# Patient Record
Sex: FEMALE | Employment: UNEMPLOYED | URBAN - METROPOLITAN AREA
[De-identification: names, ages, dates, MRNs, and addresses within clinical notes are randomized per-mention and may not be internally consistent; named-entity substitution may affect disease eponyms.]

---

## 2017-03-13 ENCOUNTER — OFFICE VISIT (OUTPATIENT)
Dept: PAIN MANAGEMENT | Age: 72
End: 2017-03-13

## 2017-03-13 VITALS — HEART RATE: 62 BPM | DIASTOLIC BLOOD PRESSURE: 85 MMHG | SYSTOLIC BLOOD PRESSURE: 175 MMHG | HEIGHT: 65 IN

## 2017-03-13 DIAGNOSIS — M15.9 PRIMARY OSTEOARTHRITIS INVOLVING MULTIPLE JOINTS: ICD-10-CM

## 2017-03-13 DIAGNOSIS — Z86.59 HISTORY OF DEPRESSION: ICD-10-CM

## 2017-03-13 DIAGNOSIS — M47.816 FACET ARTHROPATHY, LUMBAR: ICD-10-CM

## 2017-03-13 DIAGNOSIS — Z79.899 ENCOUNTER FOR LONG-TERM (CURRENT) USE OF HIGH-RISK MEDICATION: ICD-10-CM

## 2017-03-13 DIAGNOSIS — Z79.899 ENCOUNTER FOR LONG-TERM (CURRENT) DRUG USE: ICD-10-CM

## 2017-03-13 DIAGNOSIS — G89.29 CHRONIC ABDOMINAL PAIN: ICD-10-CM

## 2017-03-13 DIAGNOSIS — M51.37 DEGENERATION OF LUMBAR OR LUMBOSACRAL INTERVERTEBRAL DISC: Primary | ICD-10-CM

## 2017-03-13 DIAGNOSIS — M25.552 HIP PAIN, LEFT: ICD-10-CM

## 2017-03-13 DIAGNOSIS — R10.9 CHRONIC ABDOMINAL PAIN: ICD-10-CM

## 2017-03-13 DIAGNOSIS — E03.9 ACQUIRED HYPOTHYROIDISM: ICD-10-CM

## 2017-03-13 LAB
ALCOHOL UR POC: NORMAL
AMPHETAMINES UR POC: NORMAL
BARBITURATES UR POC: NORMAL
BENZODIAZEPINES UR POC: NORMAL
BUPRENORPHINE UR POC: NORMAL
CANNABINOIDS UR POC: NORMAL
CARISOPRODOL UR POC: NORMAL
COCAINE UR POC: NORMAL
FENTANYL UR POC: NORMAL
MDMA/ECSTASY UR POC: NORMAL
METHADONE UR POC: NORMAL
METHAMPHETAMINE UR POC: NORMAL
METHYLPHENIDATE UR POC: NORMAL
OPIATES UR POC: NORMAL
OXYCODONE UR POC: NORMAL
PHENCYCLIDINE UR POC: NORMAL
PROPOXYPHENE UR POC: NORMAL
TRAMADOL UR POC: NORMAL
TRICYCLICS UR POC: NORMAL

## 2017-03-13 RX ORDER — LEVOTHYROXINE SODIUM 100 UG/1
100 TABLET ORAL
COMMUNITY

## 2017-03-13 RX ORDER — MORPHINE SULFATE 15 MG/1
30 TABLET ORAL 4 TIMES DAILY
Qty: 240 TAB | Refills: 0 | Status: SHIPPED | OUTPATIENT
Start: 2017-04-14 | End: 2017-03-13 | Stop reason: SDUPTHER

## 2017-03-13 RX ORDER — MORPHINE SULFATE 15 MG/1
30 TABLET ORAL 4 TIMES DAILY
Qty: 240 TAB | Refills: 0 | Status: SHIPPED | OUTPATIENT
Start: 2017-03-16 | End: 2017-03-13 | Stop reason: SDUPTHER

## 2017-03-13 RX ORDER — MORPHINE SULFATE 15 MG/1
30 TABLET ORAL 4 TIMES DAILY
Qty: 240 TAB | Refills: 0 | Status: SHIPPED | OUTPATIENT
Start: 2017-05-13 | End: 2017-08-28 | Stop reason: SDUPTHER

## 2017-03-13 NOTE — PROGRESS NOTES
Nursing Notes    Patient presents to the office today in follow-up. Patient rates her pain at 6/10 on the numerical pain scale. Reviewed medications with counts as follows:    Rx Date filled Qty Dispensed Pill Count Last Dose Short   Morphine sulfate 15 mg 02/15/17 240 15 today no                                    POC UDS was performed in office today    Any new labs or imaging since last appointment? NO    Have you been to an emergency room (ER) or urgent care clinic since your last visit? NO            Have you been hospitalized since your last visit? NO     If yes, where, when, and reason for visit? Have you seen or consulted any other health care providers outside of [facility]  since your last visit? YES     If yes, where, when, and reason for visit? pcp    HM deferred to pcp.

## 2017-03-13 NOTE — MR AVS SNAPSHOT
Visit Information Date & Time Provider Department Dept. Phone Encounter #  
 3/13/2017  1:00 PM Henri [facility] [phone] [id number] Follow-up Instructions Return in about 3 months (around 6/13/2017). Follow-up and Disposition History Upcoming Health Maintenance Date Due DTaP/Tdap/Td series (1 - Tdap) 12/21/1967 BREAST CANCER SCRN MAMMOGRAM 12/21/1996 FOBT Q 1 YEAR AGE 50-75 12/21/1996 ZOSTER VACCINE AGE 60> 12/21/2006 GLAUCOMA SCREENING Q2Y 12/21/2011 OSTEOPOROSIS SCREENING (DEXA) 12/21/2011 Pneumococcal 65+ Low/Medium Risk (1 of 2 - PCV13) 12/21/2011 MEDICARE YEARLY EXAM 12/21/2011 INFLUENZA AGE 9 TO ADULT 8/1/2016 Allergies as of 3/13/2017  Review Complete On: 3/13/2017 By: Sherren Rosser, LPN No Known Allergies Current Immunizations  Never Reviewed No immunizations on file. Not reviewed this visit You Were Diagnosed With   
  
 Codes Comments Degeneration of lumbar or lumbosacral intervertebral disc    -  Primary ICD-10-CM: M51.37 
ICD-9-CM: 722.52 Hip pain, left     ICD-10-CM: M25.552 ICD-9-CM: 719.45 Encounter for long-term (current) drug use     ICD-10-CM: Z79.899 ICD-9-CM: V58.69 History of depression     ICD-10-CM: Z86.59 
ICD-9-CM: V11.8 Primary osteoarthritis involving multiple joints     ICD-10-CM: M15.0 ICD-9-CM: 715.09 Acquired hypothyroidism     ICD-10-CM: E03.9 ICD-9-CM: 176. 9 Chronic abdominal pain     ICD-10-CM: R10.9, G89.29 ICD-9-CM: 789.00, 338.29 Facet arthropathy, lumbar ([address]     ICD-10-CM: M12.88 ICD-9-CM: 721.3 Vitals BP Pulse Height(growth percentile) OB Status Smoking Status 175/85 58 5' 5\" (1.651 m) Hysterectomy Former Smoker Vitals History Preferred Pharmacy Pharmacy Name Phone [address], [address] [place] [address] [phone] Your Updated Medication List  
  
   
This list is accurate as of: 3/13/17  2:09 PM.  Always use your most recent med list. ALDACTONE 25 mg tablet Generic drug:  spironolactone Take  by mouth daily. citalopram 20 mg tablet Commonly known as:  Sheryl Dom Take  by mouth daily. cloNIDine HCl 0.3 mg tablet Commonly known as:  CATAPRES Take 0.3 mg by mouth two (2) times a day. ESTRADIOL TRANSDERMAL PATCH 0.1 mg/24 hr  
Generic drug:  estradiol 1 Patch by TransDERmal route Every Saturday. FISH OIL 1,000 mg Cap Generic drug:  omega-3 fatty acids-vitamin e Take 1 Cap by mouth.  
  
 gabapentin 300 mg capsule Commonly known as:  NEURONTIN Take 1 Cap by mouth three (3) times daily. levothyroxine 88 mcg tablet Commonly known as:  SYNTHROID Take 88 mcg by mouth Daily (before breakfast). LIPITOR 20 mg tablet Generic drug:  atorvastatin Take  by mouth daily. morphine IR 15 mg tablet Commonly known as:  MS IR Take 2 Tabs by mouth four (4) times daily for 30 days. Max Daily Amount: 120 mg. for chronic, severe, refractory pain. Indications: Pain, Severe Pain Start taking on:  5/13/2017 NexIUM 20 mg capsule Generic drug:  esomeprazole Take  by mouth daily. SINEMET  mg per tablet Generic drug:  carbidopa-levodopa Take 1 Tab by mouth three (3) times daily. spironolactone-hydrochlorothiazide 25-25 mg per tablet Commonly known as:  ALDACTAZIDE Take 1 Tab by mouth daily. temazepam 30 mg capsule Commonly known as:  RESTORIL Take  by mouth nightly as needed. VITAMIN D2 50,000 unit capsule Generic drug:  ergocalciferol TAKE 1 CAPSULE BY MOUTH 2 TIMES A WEEK AS DIRECTED  
  
 XANAX PO Take  by mouth. Prescriptions Printed Refills  
 morphine IR (MS IR) 15 mg tablet 0 Starting on: 5/13/2017 Sig: Take 2 Tabs by mouth four (4) times daily for 30 days.  Max Daily Amount: 120 mg. for chronic, severe, refractory pain. Indications: Pain, Severe Pain Class: Print Route: Oral  
  
Follow-up Instructions Return in about 3 months (around 6/13/2017). Patient Instructions Plan: 
Continue same medications as prescribed for chronic pain Follow up in 3 months or sooner if needed Regular exercise and attention to emotional health and diet remain the most effective ways to treat chronic pain of all kinds You may contact me with questions or concerns through [address] Introducing [facility]! Abdiel Salcedo introduces [employer] patient portal. Now you can access parts of your medical record, email your doctor's office, and request medication refills online. 1. In your internet browser, go to [email or web address] 2. Click on the First Time User? Click Here link in the Sign In box. You will see the New Member Sign Up page. 3. Enter your Moozey Access Code exactly as it appears below. You will not need to use this code after youve completed the sign-up process. If you do not sign up before the expiration date, you must request a new code. · Moozey Access Code: [email or web address] Expires: 3/19/2017  2:50 PM 
 
4. Enter the last four digits of your Social Security Number (xxxx) and Date of Birth (mm/dd/yyyy) as indicated and click Submit. You will be taken to the next sign-up page. 5. Create a Moozey ID. This will be your Moozey login ID and cannot be changed, so think of one that is secure and easy to remember. 6. Create a Moozey password. You can change your password at any time. 7. Enter your Password Reset Question and Answer. This can be used at a later time if you forget your password. 8. Enter your e-mail address. You will receive e-mail notification when new information is available in [address] 9. Click Sign Up. You can now view and download portions of your medical record. 10. Click the Download Summary menu link to download a portable copy of your medical information. If you have questions, please visit the Frequently Asked Questions section of the Clicks2Customers website. Remember, Clicks2Customers is NOT to be used for urgent needs. For medical emergencies, dial 911. Now available from your iPhone and Android! Please provide this summary of care documentation to your next provider. Your primary care clinician is listed as Jamey Laguna. If you have any questions after today's visit, please call [phone].

## 2017-03-13 NOTE — PROGRESS NOTES
HISTORY OF PRESENT ILLNESS  Stacia Vega is a 80 y.o. female. Patient presents for follow up of chronic, severe pain in the lumbar spine with radiation to the left hip and down the anterior left thigh into the calf and foot due to lumbar DDD and spondylosis versus nerve damage following a complicated hysterectomy in 1988. She complains of worsening fatigue, malaise, and worsenign pain. She admits, \"this is the first time I've been out of the house in over a month\". She spends much of the day in bed. She recently had her thyroid levels evaluated, and she states that Dr. Ghada Gonzales increased levothyroxine from 75 mcg to 88 mcg. She has follow up scheduled with him in a week to re-evaluate. We discussed the possibility of depression following the death of her son last year, but she does not feel that this is the case. Her situation at home is slowly improving, and her grandson has moved in with her, which has helped her financially. Pain has been under good control since her last visit. She continues to complain of persistent pain in the buttocks and lower back. She continues to describe her pain as aching, shooting, burning and tender. Pain predominates down the front of the left leg and in bilateral hips and knees. This vexing pain began abruptly after a complicated hysterectomy 25 years ago. Pain worsens with prolonged sitting and standing. Pt reports average of 5/10 pain scale most days. She denies any new symptoms. Medications continue to work well for pain control overall. Stacia Vega is tolerating medications well, with no untoward side effects noted. She is able to stay more active with less discomfort with these current doses. The patient reports an average of 60% relief with this regimen. Most recent UDS and  were consistent with prescribed medications. Pill counts are appropriate.  She is informed of side effects, risks, and benefits of this regimen, and emphasizes that she derives a significant improvement in functionality and quality of life, and notes that non-opioid medications and therapies in the past have not offered significant benefit. She denies new or worsening insomnia or constipation issues. She denies any falls, injuries, or hospitalizations since the last visit. HPI--see above    ROS  Constitutional: Negative for fever and chills. HENT: Negative for ear discharge and ear pain. Eyes: Negative for pain and discharge. Respiratory: Negative for hemoptysis and wheezing. Gastrointestinal: Negative for blood in stool and melena. Genitourinary: Negative for dysuria and hematuria. Musculoskeletal: Negative for back pain and joint pain. Skin: Negative for itching and rash. Neurological: Negative for seizures and loss of consciousness. Endo/Heme/Allergies: Negative for environmental allergies. Does not bruise/bleed easily. Psychiatric/Behavioral: Negative for suicidal ideas and hallucinations. Physical Exam  Constitutional: She is oriented to person, place, and time. She appears well-developed and well-nourished. She appears distressed and is tearful. HENT:   Head: Normocephalic and atraumatic. Eyes: Right eye exhibits no discharge. Left eye exhibits no discharge. No scleral icterus. Neck: Normal range of motion. Neck supple. No thyromegaly present. Pulmonary/Chest: Effort normal. No respiratory distress. Musculoskeletal: She exhibits tenderness. She exhibits no edema. Right shoulder: She exhibits tenderness, pain and spasm. She exhibits normal range of motion. Left hip: She exhibits tenderness. She exhibits normal range of motion. Right knee: She exhibits decreased range of motion. She exhibits no swelling. tenderness found. Left knee: She exhibits decreased range of motion. She exhibits no swelling. tenderness found. Lumbar back: She exhibits decreased range of motion and tenderness. She exhibits no pain and no spasm. Back:         Arms:       Legs:  Neurological: She is alert and oriented to person, place, and time. No cranial nerve deficit. Skin: Skin is warm and dry. She is not diaphoretic. No erythema. Psychiatric: She has a normal mood and affect. Her behavior is normal. Judgment and thought content normal.   ASSESSMENT and PLAN    ICD-10-CM ICD-9-CM    1. Degeneration of lumbar or lumbosacral intervertebral disc M51.37 722.52    2. Hip pain, left M25.552 719.45    3. Encounter for long-term (current) drug use Z79.899 V58.69    4. History of depression Z86.59 V11.8    5. Primary osteoarthritis involving multiple joints M15.0 715.09    6. Acquired hypothyroidism E03.9 244.9    7. Chronic abdominal pain R10.9 789.00     G89.29 338.29    8.  Facet arthropathy, lumbar (HCC) M12.88 721.3       Plan:  Continue same medications as prescribed for chronic pain  Follow up in 3 months or sooner if needed  Regular exercise and attention to emotional health and diet remain the most effective ways to treat chronic pain of all kinds  You may contact me with questions or concerns through [employer]

## 2017-06-05 ENCOUNTER — OFFICE VISIT (OUTPATIENT)
Dept: PAIN MANAGEMENT | Age: 72
End: 2017-06-05

## 2017-06-05 VITALS
SYSTOLIC BLOOD PRESSURE: 170 MMHG | WEIGHT: 182 LBS | DIASTOLIC BLOOD PRESSURE: 91 MMHG | BODY MASS INDEX: 30.29 KG/M2 | HEART RATE: 71 BPM

## 2017-06-05 DIAGNOSIS — M47.816 FACET ARTHROPATHY, LUMBAR: ICD-10-CM

## 2017-06-05 DIAGNOSIS — M25.552 HIP PAIN, LEFT: ICD-10-CM

## 2017-06-05 DIAGNOSIS — M51.37 DEGENERATION OF LUMBAR OR LUMBOSACRAL INTERVERTEBRAL DISC: ICD-10-CM

## 2017-06-05 DIAGNOSIS — G89.4 CHRONIC PAIN SYNDROME: ICD-10-CM

## 2017-06-05 DIAGNOSIS — M15.9 PRIMARY OSTEOARTHRITIS INVOLVING MULTIPLE JOINTS: Primary | ICD-10-CM

## 2017-06-05 DIAGNOSIS — Z79.899 ENCOUNTER FOR LONG-TERM (CURRENT) DRUG USE: ICD-10-CM

## 2017-06-05 RX ORDER — NALOXONE HYDROCHLORIDE 4 MG/.1ML
4 SPRAY NASAL AS NEEDED
Qty: 1 BOX | Refills: 1 | Status: SHIPPED | OUTPATIENT
Start: 2017-06-05

## 2017-06-05 RX ORDER — MORPHINE SULFATE 15 MG/1
30 TABLET ORAL 4 TIMES DAILY
Qty: 240 TAB | Refills: 0 | Status: SHIPPED | OUTPATIENT
Start: 2017-08-07 | End: 2017-11-20 | Stop reason: SDUPTHER

## 2017-06-05 RX ORDER — MORPHINE SULFATE 15 MG/1
30 TABLET ORAL 4 TIMES DAILY
Qty: 240 TAB | Refills: 0 | Status: SHIPPED | OUTPATIENT
Start: 2017-07-09 | End: 2017-08-28 | Stop reason: SDUPTHER

## 2017-06-05 RX ORDER — MORPHINE SULFATE 15 MG/1
30 TABLET ORAL 4 TIMES DAILY
Qty: 240 TAB | Refills: 0 | Status: SHIPPED | OUTPATIENT
Start: 2017-06-11 | End: 2017-08-28 | Stop reason: SDUPTHER

## 2017-06-05 NOTE — MR AVS SNAPSHOT
Visit Information Date & Time Provider Department Dept. Phone Encounter #  
 6/5/2017  1:40 PM Sabina Bauer MD [address] [facility] [phone] Follow-up Instructions Return in about 3 months (around 9/5/2017). Upcoming Health Maintenance Date Due DTaP/Tdap/Td series (1 - Tdap) 12/21/1967 BREAST CANCER SCRN MAMMOGRAM 12/21/1996 FOBT Q 1 YEAR AGE 50-75 12/21/1996 ZOSTER VACCINE AGE 60> 12/21/2006 GLAUCOMA SCREENING Q2Y 12/21/2011 OSTEOPOROSIS SCREENING (DEXA) 12/21/2011 Pneumococcal 65+ Low/Medium Risk (1 of 2 - PCV13) 12/21/2011 MEDICARE YEARLY EXAM 12/21/2011 INFLUENZA AGE 9 TO ADULT 8/1/2017 Allergies as of 6/5/2017  Review Complete On: 6/5/2017 By: Sabina Bauer MD  
 No Known Allergies Current Immunizations  Never Reviewed No immunizations on file. Not reviewed this visit Vitals BP Pulse Weight(growth percentile) BMI OB Status Smoking Status (!) 170/91 71 182 lb (82.6 kg) 30.29 kg/m2 Hysterectomy Former Smoker Vitals History BMI and BSA Data Body Mass Index Body Surface Area  
 30.29 kg/m 2 1.95 m 2 Preferred Pharmacy Pharmacy Name Phone [address], [address] [place] [address] [phone] Your Updated Medication List  
  
   
This list is accurate as of: 6/5/17  1:58 PM.  Always use your most recent med list. ALDACTONE 25 mg tablet Generic drug:  spironolactone Take  by mouth daily. citalopram 20 mg tablet Commonly known as:  Mardell Fortis Take  by mouth daily. cloNIDine HCl 0.3 mg tablet Commonly known as:  CATAPRES Take 0.3 mg by mouth two (2) times a day. ESTRADIOL TRANSDERMAL PATCH 0.1 mg/24 hr  
Generic drug:  estradiol 1 Patch by TransDERmal route Every Saturday. FISH OIL 1,000 mg Cap Generic drug:  omega-3 fatty acids-vitamin e Take 1 Cap by mouth. gabapentin 300 mg capsule Commonly known as:  NEURONTIN Take 1 Cap by mouth three (3) times daily. levothyroxine 88 mcg tablet Commonly known as:  SYNTHROID Take 88 mcg by mouth Daily (before breakfast). LIPITOR 20 mg tablet Generic drug:  atorvastatin Take  by mouth daily. * morphine IR 15 mg tablet Commonly known as:  MS IR Take 2 Tabs by mouth four (4) times daily for 30 days. Max Daily Amount: 120 mg. for chronic, severe, refractory pain. Indications: Pain, Severe Pain * morphine IR 15 mg tablet Commonly known as:  MS IR Take 2 Tabs by mouth four (4) times daily for 30 days. Max Daily Amount: 120 mg. for chronic, severe, refractory pain. Indications: Pain, Severe Pain Start taking on:  6/11/2017 * morphine IR 15 mg tablet Commonly known as:  MS IR Take 2 Tabs by mouth four (4) times daily for 30 days. Max Daily Amount: 120 mg. for chronic, severe, refractory pain. Indications: Pain, Severe Pain Start taking on:  7/9/2017 * morphine IR 15 mg tablet Commonly known as:  MS IR Take 2 Tabs by mouth four (4) times daily for 30 days. Max Daily Amount: 120 mg. for chronic, severe, refractory pain. Indications: Pain, Severe Pain Start taking on:  8/7/2017  
  
 naloxone 4 mg/actuation Spry 4 mg by Nasal route as needed for up to 2 doses. Indications: OPIOID TOXICITY NexIUM 20 mg capsule Generic drug:  esomeprazole Take  by mouth daily. SINEMET  mg per tablet Generic drug:  carbidopa-levodopa Take 1 Tab by mouth three (3) times daily. spironolactone-hydrochlorothiazide 25-25 mg per tablet Commonly known as:  ALDACTAZIDE Take 1 Tab by mouth daily. temazepam 30 mg capsule Commonly known as:  RESTORIL Take  by mouth nightly as needed. VITAMIN D2 50,000 unit capsule Generic drug:  ergocalciferol TAKE 1 CAPSULE BY MOUTH 2 TIMES A WEEK AS DIRECTED  
  
 XANAX PO Take  by mouth. * Notice: This list has 4 medication(s) that are the same as other medications prescribed for you. Read the directions carefully, and ask your doctor or other care provider to review them with you. Prescriptions Printed Refills  
 morphine IR (MS IR) 15 mg tablet 0 Starting on: 2017 Sig: Take 2 Tabs by mouth four (4) times daily for 30 days. Max Daily Amount: 120 mg. for chronic, severe, refractory pain. Indications: Pain, Severe Pain Class: Print Route: Oral  
  
Prescriptions Sent to Pharmacy Refills  
 naloxone 4 mg/actuation spry 1 Si mg by Nasal route as needed for up to 2 doses. Indications: OPIOID TOXICITY Class: Normal  
 Pharmacy: [id number] [place] [address], [facility] #: [phone] Route: Nasal  
  
Follow-up Instructions Return in about 3 months (around 2017). Patient Instructions Current health maintenance issues were reviewed and the patient was advised to followup with his/her PCP for completion of these items. Introducing [facility]! Gemma Bruce introduces [employer] patient portal. Now you can access parts of your medical record, email your doctor's office, and request medication refills online. 1. In your internet browser, go to [email or web address] 2. Click on the First Time User? Click Here link in the Sign In box. You will see the New Member Sign Up page. 3. Enter your datatracker Access Code exactly as it appears below. You will not need to use this code after youve completed the sign-up process. If you do not sign up before the expiration date, you must request a new code. · datatracker Access Code: [email or web address] Expires: 9/3/2017  1:58 PM 
 
4. Enter the last four digits of your Social Security Number (xxxx) and Date of Birth (mm/dd/yyyy) as indicated and click Submit. You will be taken to the next sign-up page. 5. Create a datatracker ID. This will be your datatracker login ID and cannot be changed, so think of one that is secure and easy to remember. 6. Create a datatracker password. You can change your password at any time. 7. Enter your Password Reset Question and Answer. This can be used at a later time if you forget your password. 8. Enter your e-mail address. You will receive e-mail notification when new information is available in [address] 9. Click Sign Up. You can now view and download portions of your medical record. 10. Click the Download Summary menu link to download a portable copy of your medical information. If you have questions, please visit the Frequently Asked Questions section of the datatracker website. Remember, datatracker is NOT to be used for urgent needs. For medical emergencies, dial 911. Now available from your iPhone and Android! Please provide this summary of care documentation to your next provider. Your primary care clinician is listed as Renzo Avers. If you have any questions after today's visit, please call [phone].

## 2017-06-05 NOTE — PROGRESS NOTES
HISTORY OF PRESENT ILLNESS  Darryl Hernandez is a 80 y.o. female. HPI Patient presents for follow up of chronic, severe pain in the lumbar spine with radiation to the left hip and down the anterior left thigh into the calf and foot due to lumbar DDD and spondylosis versus nerve damage following a complicated hysterectomy in 1988. Since last seen, she has been diagnosed with a strep infection of the left lower extremity for which she is currently on doxycycline. She does feel there has been improvement. Pain has been under good control since her last visit. She continues to complain of persistent pain in the buttocks and lower back. She continues to describe her pain as aching, shooting, burning and tender. Pain predominates down the front of the left leg and in bilateral hips and knees. This vexing pain began abruptly after a complicated hysterectomy 25 years ago. Pain worsens with prolonged sitting and standing. Pt reports average of 5/10 pain scale most days. She denies any new symptoms. Pain level today 5 out of 10, outcome 14/28,(The lower the upper number, the better the outcome)  Physical activity and mobility, mood, and sleep are all poor. No reported side effects. Medications continue to work well for pain control overall. Darryl Hernandez is tolerating medications well, with no untoward side effects noted. She is able to stay more active with less discomfort with these current doses. The patient reports an average of 60% relief with this regimen. Most recent UDS and  were consistent with prescribed medications. Pill counts are appropriate. She is informed of side effects, risks, and benefits of this regimen, and emphasizes that she derives a significant improvement in functionality and quality of life, and notes that non-opioid medications and therapies in the past have not offered significant benefit. She denies new or worsening insomnia or constipation issues.  She denies any falls, injuries, or hospitalizations since the last visit. Review of Systems   Constitutional: Negative for chills and fever. HENT: Negative for ear discharge and ear pain. Eyes: Negative for pain and discharge. Respiratory: Negative for hemoptysis and wheezing. Gastrointestinal: Negative for blood in stool and melena. Genitourinary: Negative for dysuria and hematuria. Musculoskeletal: Negative for back pain and joint pain. Skin: Negative for itching and rash. Neurological: Negative for seizures and loss of consciousness. Endo/Heme/Allergies: Negative for environmental allergies. Does not bruise/bleed easily. Psychiatric/Behavioral: Negative for hallucinations and suicidal ideas. All other systems reviewed and are negative. Physical Exam   Constitutional: She appears well-developed and well-nourished. She is cooperative. She does not have a sickly appearance. HENT:   Head: Normocephalic and atraumatic. Right Ear: External ear normal. No drainage. Left Ear: External ear normal. No drainage. Nose: Nose normal.   Eyes: Lids are normal. Right eye exhibits no discharge. Left eye exhibits no discharge. Right conjunctiva has no hemorrhage. Left conjunctiva has no hemorrhage. Neck: Neck supple. No tracheal deviation present. No thyroid mass and no thyromegaly present. Pulmonary/Chest: Effort normal. No respiratory distress. Neurological: She is alert. No cranial nerve deficit. Skin: Skin is intact. No rash noted. Psychiatric: Her speech is normal. Her affect is not angry. She does not express inappropriate judgment. Nursing note and vitals reviewed. ASSESSMENT and PLAN  Encounter Diagnoses   Name Primary?     Primary osteoarthritis involving multiple joints Yes    Chronic pain syndrome     Encounter for long-term (current) drug use     Hip pain, left     Facet arthropathy, lumbar (HCC)     Degeneration of lumbar or lumbosacral intervertebral disc      She will continue on her current analgesic regimen as this is providing good pain control with improve functionality and minimal side effects. Because the patient's current regimen places him/her at increased risk for possible overdose, a prescription for naloxone nasal spray is being provided. The patient understands that this medication is only to be used in the setting of a possible overdose and that inadvertent use of this medication could precipitate overt withdrawal.    3 month reassess her    No concerns are raised for misuse, abuse, or diversion. 1. Pain medications are prescribed with the objective of pain relief and improved physical and psychosocial function in this patient. 2. Counseled patient on proper use of prescribed medications and reviewed opioid contract. 3. Counseled patient about chronic medical conditions and their relationship to anxiety and depression and recommended mental health support as needed. 4. Reviewed with patient self-help tools, home exercise, and lifestyle changes to assist the patient in self-management of symptoms. 5. Advised patient to have a primary care provider to continue care for health maintenance and general medical conditions and support for referral to specialty care as needed. 6. Reviewed with patient the treatment plan, goals of treatment plan, and limitations of treatment plan, to include the potential for side effects from medications and procedures. If side effects occur, it is the responsibility of the patient to inform the clinic so that a change in the treatment plan can be made in a safe manner. The patient is advised that stopping prescribed medication may cause an increase in symptoms and possible medication withdrawal symptoms. The patient is informed an emergency room evaluation may be necessary if this occurs. DISPOSITION: The patients condition and plan were discussed at length and all questions were answered. The patient agrees with the plan.     Counseling occupied > 50% of visit:  Total time: 40 minutes

## 2017-07-19 DIAGNOSIS — M25.552 HIP PAIN, LEFT: ICD-10-CM

## 2017-07-19 DIAGNOSIS — M47.816 FACET ARTHROPATHY, LUMBAR: ICD-10-CM

## 2017-07-19 DIAGNOSIS — Z86.59 HISTORY OF DEPRESSION: ICD-10-CM

## 2017-07-19 DIAGNOSIS — G89.4 CHRONIC PAIN SYNDROME: ICD-10-CM

## 2017-07-19 DIAGNOSIS — Z79.899 ENCOUNTER FOR LONG-TERM (CURRENT) DRUG USE: ICD-10-CM

## 2017-07-20 RX ORDER — GABAPENTIN 300 MG/1
300 CAPSULE ORAL 3 TIMES DAILY
Qty: 90 CAP | Refills: 5 | Status: SHIPPED | OUTPATIENT
Start: 2017-07-20 | End: 2021-10-04

## 2017-08-28 ENCOUNTER — OFFICE VISIT (OUTPATIENT)
Dept: PAIN MANAGEMENT | Age: 72
End: 2017-08-28

## 2017-08-28 VITALS
HEART RATE: 58 BPM | TEMPERATURE: 97.8 F | SYSTOLIC BLOOD PRESSURE: 143 MMHG | RESPIRATION RATE: 22 BRPM | BODY MASS INDEX: 30.29 KG/M2 | WEIGHT: 182 LBS | DIASTOLIC BLOOD PRESSURE: 76 MMHG

## 2017-08-28 DIAGNOSIS — Z79.899 ENCOUNTER FOR LONG-TERM (CURRENT) USE OF HIGH-RISK MEDICATION: Primary | ICD-10-CM

## 2017-08-28 DIAGNOSIS — M47.816 FACET ARTHROPATHY, LUMBAR: ICD-10-CM

## 2017-08-28 DIAGNOSIS — G89.4 CHRONIC PAIN SYNDROME: ICD-10-CM

## 2017-08-28 DIAGNOSIS — M51.37 DEGENERATION OF LUMBAR OR LUMBOSACRAL INTERVERTEBRAL DISC: ICD-10-CM

## 2017-08-28 DIAGNOSIS — M79.2 NEURALGIA: ICD-10-CM

## 2017-08-28 DIAGNOSIS — Z79.899 ENCOUNTER FOR LONG-TERM (CURRENT) DRUG USE: ICD-10-CM

## 2017-08-28 DIAGNOSIS — M15.9 PRIMARY OSTEOARTHRITIS INVOLVING MULTIPLE JOINTS: ICD-10-CM

## 2017-08-28 DIAGNOSIS — Z86.59 HISTORY OF DEPRESSION: ICD-10-CM

## 2017-08-28 DIAGNOSIS — M25.552 HIP PAIN, LEFT: ICD-10-CM

## 2017-08-28 LAB
ALCOHOL UR POC: NORMAL
AMPHETAMINES UR POC: NEGATIVE
BARBITURATES UR POC: NEGATIVE
BENZODIAZEPINES UR POC: NORMAL
BUPRENORPHINE UR POC: NORMAL
CANNABINOIDS UR POC: NEGATIVE
CARISOPRODOL UR POC: NORMAL
COCAINE UR POC: NEGATIVE
FENTANYL UR POC: NORMAL
MDMA/ECSTASY UR POC: NEGATIVE
METHADONE UR POC: NEGATIVE
METHAMPHETAMINE UR POC: NEGATIVE
METHYLPHENIDATE UR POC: NEGATIVE
OPIATES UR POC: NORMAL
OXYCODONE UR POC: NEGATIVE
PHENCYCLIDINE UR POC: NORMAL
PROPOXYPHENE UR POC: NORMAL
TRAMADOL UR POC: NORMAL
TRICYCLICS UR POC: NEGATIVE

## 2017-08-28 RX ORDER — MORPHINE SULFATE 15 MG/1
30 TABLET ORAL 4 TIMES DAILY
Qty: 240 TAB | Refills: 0 | Status: SHIPPED | OUTPATIENT
Start: 2017-09-06 | End: 2017-11-20 | Stop reason: SDUPTHER

## 2017-08-28 RX ORDER — MORPHINE SULFATE 15 MG/1
30 TABLET ORAL 4 TIMES DAILY
Qty: 240 TAB | Refills: 0 | Status: SHIPPED | OUTPATIENT
Start: 2017-11-05 | End: 2017-12-05

## 2017-08-28 RX ORDER — MORPHINE SULFATE 15 MG/1
30 TABLET ORAL 4 TIMES DAILY
Qty: 240 TAB | Refills: 0 | Status: SHIPPED | OUTPATIENT
Start: 2017-10-06 | End: 2017-11-20 | Stop reason: SDUPTHER

## 2017-08-28 NOTE — PROGRESS NOTES
Nursing Notes    Patient presents to the office today in follow-up. Patient rates her pain at 7/10 on the numerical pain scale. Reviewed medications with counts as follows:    Rx Date filled Qty Dispensed Pill Count Last Dose Short   Morphine sulfate 15mg IR 08/07/17 240 68 This am  No                                           Comments:     POC UDS was performed in office today    Any new labs or imaging since last appointment? YES; labwork with pcp and urgent care     Have you been to an emergency room (ER) or urgent care clinic since your last visit? YES    ; urgent care for strep         Have you been hospitalized since your last visit? NO     If yes, where, when, and reason for visit? Have you seen or consulted any other health care providers outside of the [address] [place] [address]  since your last visit? YES     If yes, where, when, and reason for visit? pcp , urgent care physicians     HM deferred to pcp.

## 2017-08-28 NOTE — PROGRESS NOTES
Is talked to me HISTORY OF PRESENT ILLNESS  Rubi David is a 80 y.o. female    HPI: Ms. Venu Waters presents today for follow up of chronic, severe pain in the lumbar spine with radiation to the left hip and down the anterior left thigh into the calf and foot due to lumbar DDD and spondylosis versus nerve damage following a complicated hysterectomy in 1988. No history of  prior surgeries to the back. History of injections and physical therapy with poor results. This is my first visit with this patient. The patient denies any significant changes since last f/u. Ms. Venu Waters does report her sleep has improved since last visit. She is still grieving the death of her 63-year-old son from a sudden pulmonary embolism. She states, \"he did always say he hopes he does not suffer when it is his time to go\". She is happy that he did not suffer. She tolerates her medications without side effects. Rubi David reports no change in constipation. No cpap    Current medication management consists of: Morphine IR 15 mg tab, take 2 tablets PO four times daily   Pain has been under good control since her last visit. She continues to complain of persistent pain in the buttocks (L>R) and lower back. She continues to describe her pain as aching, shooting, burning and tender. Pain predominates down the front of the left leg and in bilateral hips and knees. This vexing pain began abruptly after a complicated hysterectomy 25 years ago. Pain worsens with prolonged sitting and standing. Pt reports average of 7/10 pain scale most days. She denies any new symptoms. The patient reports an average of 60% relief with this regimen. Ms. Venu Waters currently lives with her 63-year-old grandson. She does try to get some exercise around the house. She reports she cannot run a vacuum around the house anymore but she does use a carpet sweeper as best she can. Pain Meds and Quality Of Life have been reviewed. Nonpharmacologic therapy and non-opioid pharmacologic therapy were considered. If opioid therapy is prescribed, this is only if the expected benefits are anticipated to outweigh risks. She  is otherwise doing well with no other complaints today. She denies any adverse events including nausea, vomiting, dizziness, increased constipation, hallucinations, or seizures. The patient reports functional improvement and QOL with pain medication. Vitals:    08/28/17 1039   BP: 143/76   Pulse: (!) 58   Resp: 22   Temp: 97.8 °F (36.6 °C)   TempSrc: Axillary   Weight: 82.6 kg (182 lb)   PainSc:   7   PainLoc: Hip         No Known Allergies    Past Surgical History:   Procedure Laterality Date    HX HYSTERECTOMY      HX OTHER SURGICAL      surgical repair for hydronephrosis, right side    HX OTHER SURGICAL  11-    antrectomy with Billroth II anastomosis       Review of Systems   Constitutional: Positive for malaise/fatigue. Negative for chills, fever and weight loss. HENT: Negative for ear pain, hearing loss, nosebleeds, sinus pain and sore throat. Seasonal allergies   Eyes: Negative for blurred vision, double vision and pain. Respiratory: Negative for cough, shortness of breath and wheezing. Cardiovascular: Positive for chest pain and leg swelling. Negative for palpitations. She has a hiatial hernia. Followed by PCP   Gastrointestinal: Positive for constipation and heartburn. Negative for diarrhea, nausea and vomiting. Relief for heartache with nexium  Constipation relief with OTC   Genitourinary: Negative for dysuria, frequency and urgency. Musculoskeletal: Positive for joint pain. Negative for back pain, falls, myalgias and neck pain. Skin: Negative for itching and rash. Neurological: Negative for dizziness, tingling, tremors, seizures, loss of consciousness, weakness and headaches. Psychiatric/Behavioral: Positive for depression. Negative for suicidal ideas.  The patient is not nervous/anxious and does not have insomnia. Physical Exam   Constitutional: She is oriented to person, place, and time and well-developed, well-nourished, and in no distress. She appears healthy. Non-toxic appearance. No distress. HENT:   Head: Normocephalic and atraumatic. Eyes: Right eye exhibits no discharge. Left eye exhibits no discharge. Neck: Neck supple. Pulmonary/Chest: Effort normal.   Musculoskeletal: She exhibits tenderness. Right hip: She exhibits decreased strength and tenderness. Left hip: She exhibits decreased strength and tenderness. Right knee: Tenderness found. Lumbar back: She exhibits tenderness and pain. Neurological: She is alert and oriented to person, place, and time. Skin: Skin is warm and dry. She is not diaphoretic. Psychiatric: Mood and affect normal.   Nursing note and vitals reviewed. ASSESSMENT:    1. Encounter for long-term (current) use of high-risk medication    2. Degeneration of lumbar or lumbosacral intervertebral disc    3. Neuralgia    4. Hip pain, left    5. Primary osteoarthritis involving multiple joints    6. Facet arthropathy, lumbar    7. Chronic pain syndrome    8. History of depression    9. Encounter for long-term (current) drug use          [place] Prescription Monitoring Program was reviewed which does not demonstrate aberrancies and/or inconsistencies with regard to the historical prescribing of controlled medications to this patient by other providers. Medications were brought to visit today. Pill count was appropriate. The patients condition and plan were discussed. All questions were answered. The patient agrees with the plan. PLAN / Pt Instructions:  1. Continue current plan with no evidence of addiction or diversion. 2. Stable on current medication without adverse events. 3. Refilled Morphine IR 15 mg tab, take 2 tablets PO four times daily   4.  Discussed risks of addiction, dependency, and opioid induced hyperalgesia. 5. Reviewed with patient benefits of home exercise, and lifestyle changes to assist the patient in self-management of symptoms. 6. Return to clinic in 3 months      Medications Ordered Today   Medications    morphine IR (MS IR) 15 mg tablet     Sig: Take 2 Tabs by mouth four (4) times daily for 30 days. Max Daily Amount: 120 mg. for chronic, severe, refractory pain. Indications: Pain, Severe Pain     Dispense:  240 Tab     Refill:  0    morphine IR (MS IR) 15 mg tablet     Sig: Take 2 Tabs by mouth four (4) times daily for 30 days. Max Daily Amount: 120 mg. for chronic, severe, refractory pain. Indications: Pain, Severe Pain     Dispense:  240 Tab     Refill:  0    morphine IR (MS IR) 15 mg tablet     Sig: Take 2 Tabs by mouth four (4) times daily for 30 days. Max Daily Amount: 120 mg. for chronic, severe, refractory pain. Indications: Pain, Severe Pain     Dispense:  240 Tab     Refill:  0       Prescription monitoring program reviewed. POC UDS today. Pain medications prescribed with the objective of pain relief and improved physical and psychosocial function in this patient. DISPOSITION  · Counseled patient on proper use of prescribed medications. · Counseled patient about chronic medical conditions and their relationship to anxiety and depression and recommended mental health support as needed. · Reviewed with patient self-help tools, home exercise, and lifestyle changes to assist the patient in self-management of symptoms. · Reviewed with patient the treatment plan, goals of treatment plan, and limitations of treatment plan, to include the potential for side effects from medications and procedures. If side effects occur, it is the responsibility of the patient to inform the clinic so that a change in the treatment plan can be made in a safe manner.  The patient is advised that stopping prescribed medication may cause an increase in symptoms and possible medication withdrawal symptoms. The patient is informed an emergency room evaluation may be necessary if this occurs. Spent 25 minutes with patient today reviewing the treatment plan, goals of treatment plan, and limitations of the treatment plan, to include the potential for side effects from medications and procedures. Chi Jackman PA-C 8/28/2017        Note: Please excuse any typographical errors. Voice recognition software was used for this note and may cause mistakes.

## 2017-08-28 NOTE — PATIENT INSTRUCTIONS
PLAN / Pt Instructions:  1. Continue current plan with no evidence of addiction or diversion. 2. Stable on current medication without adverse events. 3. Refilled Morphine IR 15 mg tab, take 2 tablets PO four times daily   4. Discussed risks of addiction, dependency, and opioid induced hyperalgesia. 5. Reviewed with patient benefits of home exercise, and lifestyle changes to assist the patient in self-management of symptoms.   6. Return to clinic in 3 months

## 2017-08-28 NOTE — MR AVS SNAPSHOT
Visit Information Date & Time Provider Department Dept. Phone Encounter #  
 8/28/2017 10:40 AM Greg Dove [address] [place] [address] for Pain Management [phone] Follow-up Instructions Return in about 3 months (around 11/28/2017). Upcoming Health Maintenance Date Due DTaP/Tdap/Td series (1 - Tdap) 12/21/1967 BREAST CANCER SCRN MAMMOGRAM 12/21/1996 FOBT Q 1 YEAR AGE 50-75 12/21/1996 ZOSTER VACCINE AGE 60> 10/21/2006 GLAUCOMA SCREENING Q2Y 12/21/2011 OSTEOPOROSIS SCREENING (DEXA) 12/21/2011 Pneumococcal 65+ Low/Medium Risk (1 of 2 - PCV13) 12/21/2011 MEDICARE YEARLY EXAM 12/21/2011 INFLUENZA AGE 9 TO ADULT 8/1/2017 Allergies as of 8/28/2017  Review Complete On: 8/28/2017 By: Trip Torres PA-C No Known Allergies Current Immunizations  Never Reviewed No immunizations on file. Not reviewed this visit You Were Diagnosed With   
  
 Codes Comments Encounter for long-term (current) use of high-risk medication    -  Primary ICD-10-CM: Q51.445 ICD-9-CM: V58.69 Degeneration of lumbar or lumbosacral intervertebral disc     ICD-10-CM: M51.37 
ICD-9-CM: 722.52 Neuralgia     ICD-10-CM: M79.2 ICD-9-CM: 729.2 Hip pain, left     ICD-10-CM: M25.552 ICD-9-CM: 719.45 Primary osteoarthritis involving multiple joints     ICD-10-CM: M15.0 ICD-9-CM: 715.09 Facet arthropathy, lumbar     ICD-10-CM: M12.88 ICD-9-CM: 274. 3 Chronic pain syndrome     ICD-10-CM: G89.4 ICD-9-CM: 338.4 History of depression     ICD-10-CM: Z86.59 
ICD-9-CM: V11.8 Encounter for long-term (current) drug use     ICD-10-CM: Z79.899 ICD-9-CM: V58.69 Vitals BP Pulse Temp Resp Weight(growth percentile) BMI  
 143/76 (!) 58 97.8 °F (36.6 °C) (Axillary) 22 182 lb (82.6 kg) 30.29 kg/m2 OB Status Smoking Status Hysterectomy Former Smoker Vitals History BMI and BSA Data Body Mass Index Body Surface Area  
 30.29 kg/m 2 1.95 m 2 Preferred Pharmacy Pharmacy Name Phone [address], [address] [place] [address] [phone] Your Updated Medication List  
  
   
This list is accurate as of: 8/28/17 11:26 AM.  Always use your most recent med list. ALDACTONE 25 mg tablet Generic drug:  spironolactone Take  by mouth daily. citalopram 20 mg tablet Commonly known as:  [place] Take  by mouth daily. cloNIDine HCl 0.3 mg tablet Commonly known as:  CATAPRES Take 0.3 mg by mouth two (2) times a day. ESTRADIOL TRANSDERMAL PATCH 0.1 mg/24 hr  
Generic drug:  estradiol 1 Patch by TransDERmal route Every Saturday. FISH OIL 1,000 mg Cap Generic drug:  omega-3 fatty acids-vitamin e Take 1 Cap by mouth.  
  
 gabapentin 300 mg capsule Commonly known as:  NEURONTIN Take 1 Cap by mouth three (3) times daily. levothyroxine 88 mcg tablet Commonly known as:  SYNTHROID Take 88 mcg by mouth Daily (before breakfast). LIPITOR 20 mg tablet Generic drug:  atorvastatin Take  by mouth daily. * morphine IR 15 mg tablet Commonly known as:  MS IR Take 2 Tabs by mouth four (4) times daily for 30 days. Max Daily Amount: 120 mg. for chronic, severe, refractory pain. Indications: Pain, Severe Pain * morphine IR 15 mg tablet Commonly known as:  MS IR Take 2 Tabs by mouth four (4) times daily for 30 days. Max Daily Amount: 120 mg. for chronic, severe, refractory pain. Indications: Pain, Severe Pain Start taking on:  9/6/2017 * morphine IR 15 mg tablet Commonly known as:  MS IR Take 2 Tabs by mouth four (4) times daily for 30 days. Max Daily Amount: 120 mg. for chronic, severe, refractory pain. Indications: Pain, Severe Pain Start taking on:  10/6/2017 * morphine IR 15 mg tablet Commonly known as:  MS IR  
 Take 2 Tabs by mouth four (4) times daily for 30 days. Max Daily Amount: 120 mg. for chronic, severe, refractory pain. Indications: Pain, Severe Pain Start taking on:  11/5/2017  
  
 naloxone 4 mg/actuation Spry 4 mg by Nasal route as needed for up to 2 doses. Indications: OPIOID TOXICITY NexIUM 20 mg capsule Generic drug:  esomeprazole Take  by mouth daily. SINEMET  mg per tablet Generic drug:  carbidopa-levodopa Take 1 Tab by mouth three (3) times daily. spironolactone-hydrochlorothiazide 25-25 mg per tablet Commonly known as:  ALDACTAZIDE Take 1 Tab by mouth daily. temazepam 30 mg capsule Commonly known as:  RESTORIL Take  by mouth nightly as needed. VITAMIN D2 50,000 unit capsule Generic drug:  ergocalciferol TAKE 1 CAPSULE BY MOUTH 2 TIMES A WEEK AS DIRECTED  
  
 XANAX PO Take  by mouth. * Notice: This list has 4 medication(s) that are the same as other medications prescribed for you. Read the directions carefully, and ask your doctor or other care provider to review them with you. Prescriptions Printed Refills  
 morphine IR (MS IR) 15 mg tablet 0 Starting on: 11/5/2017 Sig: Take 2 Tabs by mouth four (4) times daily for 30 days. Max Daily Amount: 120 mg. for chronic, severe, refractory pain. Indications: Pain, Severe Pain Class: Print Route: Oral  
 morphine IR (MS IR) 15 mg tablet 0 Starting on: 10/6/2017 Sig: Take 2 Tabs by mouth four (4) times daily for 30 days. Max Daily Amount: 120 mg. for chronic, severe, refractory pain. Indications: Pain, Severe Pain Class: Print Route: Oral  
 morphine IR (MS IR) 15 mg tablet 0 Starting on: 9/6/2017 Sig: Take 2 Tabs by mouth four (4) times daily for 30 days. Max Daily Amount: 120 mg. for chronic, severe, refractory pain. Indications: Pain, Severe Pain Class: Print Route: Oral  
  
We Performed the Following AMB POC DRUG SCREEN () [ [facility]] DRUG SCREEN [[id number] Custom] Follow-up Instructions Return in about 3 months (around 11/28/2017). Patient Instructions PLAN / Pt Instructions: 1. Continue current plan with no evidence of addiction or diversion. 2. Stable on current medication without adverse events. 3. Refilled Morphine IR 15 mg tab, take 2 tablets PO four times daily 4. Discussed risks of addiction, dependency, and opioid induced hyperalgesia. 5. Reviewed with patient benefits of home exercise, and lifestyle changes to assist the patient in self-management of symptoms. 6. Return to clinic in 3 months Introducing [facility]! Adriana Donahue introduces [employer] patient portal. Now you can access parts of your medical record, email your doctor's office, and request medication refills online. 1. In your internet browser, go to [email or web address] 2. Click on the First Time User? Click Here link in the Sign In box. You will see the New Member Sign Up page. 3. Enter your [employer] Access Code exactly as it appears below. You will not need to use this code after youve completed the sign-up process. If you do not sign up before the expiration date, you must request a new code. · [employer] Access Code: [email or web address] Expires: 9/3/2017  1:58 PM 
 
4. Enter the last four digits of your Social Security Number (xxxx) and Date of Birth (mm/dd/yyyy) as indicated and click Submit. You will be taken to the next sign-up page. 5. Create a [email or web address] ID. This will be your [employer] login ID and cannot be changed, so think of one that is secure and easy to remember. 6. Create a [employer] password. You can change your password at any time. 7. Enter your Password Reset Question and Answer. This can be used at a later time if you forget your password. 8. Enter your e-mail address.  You will receive e-mail notification when new information is available in [email or web address]. 9. Click Sign Up. You can now view and download portions of your medical record. 10. Click the Download Summary menu link to download a portable copy of your medical information. If you have questions, please visit the Frequently Asked Questions section of the [email or web address] website. Remember, [email or web address] is NOT to be used for urgent needs. For medical emergencies, dial 911. Now available from your iPhone and Android! Please provide this summary of care documentation to your next provider. Your primary care clinician is listed as Melly Miller. If you have any questions after today's visit, please call [phone].

## 2017-11-20 ENCOUNTER — OFFICE VISIT (OUTPATIENT)
Dept: PAIN MANAGEMENT | Age: 72
End: 2017-11-20

## 2017-11-20 VITALS
SYSTOLIC BLOOD PRESSURE: 170 MMHG | BODY MASS INDEX: 30.32 KG/M2 | DIASTOLIC BLOOD PRESSURE: 83 MMHG | WEIGHT: 182 LBS | TEMPERATURE: 97 F | HEIGHT: 65 IN | RESPIRATION RATE: 18 BRPM | HEART RATE: 61 BPM

## 2017-11-20 DIAGNOSIS — M51.37 DEGENERATION OF LUMBAR OR LUMBOSACRAL INTERVERTEBRAL DISC: Primary | ICD-10-CM

## 2017-11-20 DIAGNOSIS — R10.9 CHRONIC ABDOMINAL PAIN: ICD-10-CM

## 2017-11-20 DIAGNOSIS — M25.552 HIP PAIN, LEFT: ICD-10-CM

## 2017-11-20 DIAGNOSIS — G89.4 CHRONIC PAIN SYNDROME: ICD-10-CM

## 2017-11-20 DIAGNOSIS — M47.816 FACET ARTHROPATHY, LUMBAR: ICD-10-CM

## 2017-11-20 DIAGNOSIS — Z79.899 ENCOUNTER FOR LONG-TERM (CURRENT) USE OF HIGH-RISK MEDICATION: ICD-10-CM

## 2017-11-20 DIAGNOSIS — M15.9 PRIMARY OSTEOARTHRITIS INVOLVING MULTIPLE JOINTS: ICD-10-CM

## 2017-11-20 DIAGNOSIS — M79.605 PAIN OF LEFT LOWER EXTREMITY: ICD-10-CM

## 2017-11-20 DIAGNOSIS — G89.29 CHRONIC ABDOMINAL PAIN: ICD-10-CM

## 2017-11-20 RX ORDER — MORPHINE SULFATE 15 MG/1
30 TABLET ORAL 4 TIMES DAILY
Qty: 240 TAB | Refills: 0 | Status: SHIPPED | OUTPATIENT
Start: 2018-02-02 | End: 2017-11-20 | Stop reason: CLARIF

## 2017-11-20 RX ORDER — MORPHINE SULFATE 15 MG/1
30 TABLET ORAL 4 TIMES DAILY
Qty: 240 TAB | Refills: 0 | Status: SHIPPED | OUTPATIENT
Start: 2017-11-20 | End: 2017-11-20 | Stop reason: CLARIF

## 2017-11-20 RX ORDER — MORPHINE SULFATE 15 MG/1
30 TABLET ORAL 4 TIMES DAILY
Qty: 240 TAB | Refills: 0 | Status: SHIPPED | OUTPATIENT
Start: 2018-02-02 | End: 2018-03-04

## 2017-11-20 RX ORDER — GABAPENTIN 300 MG/1
300 CAPSULE ORAL ONCE
Qty: 30 CAP | Refills: 2 | Status: SHIPPED | OUTPATIENT
Start: 2017-11-20 | End: 2018-04-18 | Stop reason: SDUPTHER

## 2017-11-20 RX ORDER — MORPHINE SULFATE 15 MG/1
30 TABLET ORAL 4 TIMES DAILY
Qty: 240 TAB | Refills: 0 | Status: SHIPPED | OUTPATIENT
Start: 2017-12-04 | End: 2018-02-15 | Stop reason: SDUPTHER

## 2017-11-20 RX ORDER — MORPHINE SULFATE 15 MG/1
30 TABLET ORAL 4 TIMES DAILY
Qty: 240 TAB | Refills: 0 | Status: SHIPPED | OUTPATIENT
Start: 2018-01-03 | End: 2018-02-15 | Stop reason: SDUPTHER

## 2017-11-20 RX ORDER — MORPHINE SULFATE 15 MG/1
30 TABLET ORAL 4 TIMES DAILY
Qty: 240 TAB | Refills: 0 | Status: SHIPPED | OUTPATIENT
Start: 2018-01-03 | End: 2017-11-20 | Stop reason: CLARIF

## 2017-11-20 NOTE — PROGRESS NOTES
Nursing Notes    Patient presents to the office today in follow-up. Patient rates her pain at 7/10 on the numerical pain scale. Reviewed medications with counts as follows:    Rx Date filled Qty Dispensed Pill Count Last Dose Short   Morphine sulf 15 mg 11/05/17 240 120 This a.m no                                        POC UDS was not performed in office today    Any new labs or imaging since last appointment? NO    Have you been to an emergency room (ER) or urgent care clinic since your last visit? NO           Have you been hospitalized since your last visit? NO     If yes, where, when, and reason for visit? Have you seen or consulted any other health care providers outside of the [facility]  since your last visit? YES, PCP     If yes, where, when, and reason for visit? HM deferred to pcp.

## 2017-11-20 NOTE — PROGRESS NOTES
HISTORY OF PRESENT ILLNESS  Lili Le is a 80 y.o. female    Ms. Erfa Schmidt presents today for follow up of chronic, severe pain in the lumbar spine with radiation to the left hip and down the anterior left thigh into the calf and foot due to lumbar DDD and spondylosis versus nerve damage following a complicated hysterectomy in 1988. No history of prior surgeries to the back. She has had history of injections and physical therapy with poor results. The patient denies any significant changes since last f/u. She continues to grieve the death of her 27-year-old son from a sudden pulmonary embolism. The holidays are always a hard time of year after the loss of a loved one. She reports that she will see her PCP in about 3 weeks. She plans on discussing her elevated blood pressure with him at that visit. She tolerates her medications without side effects. Lili Le reports no change in constipation. We discussed the number of tablets she takes daily. She prefers to take two 15 mg morphine extended release tablets 4 times a day instead of one 30 mg tablet 4 times a day. I have informed her that this may change in the future. We discussed her current condition and medications in detail today. She tolerates medications without side effects. Lili Le reports no change in sleep or constipation. Current medication management consists of: Morphine IR 15 mg tab, take 2 tablets  four times daily, gabapentin 300 mg capsule, 1 capsule nightly. She continues to complain of persistent pain in the buttocks (L>R) and lower back. She continues to describe her pain as aching, shooting, burning and tender. Pain predominates down the front of the left leg and in bilateral hips and knees. This pain began abruptly after a complicated hysterectomy 25 years ago. Pain worsens with prolonged sitting and standing. Pt reports average of 7/10 pain scale most days. She denies any new symptoms.  The patient reports an average of 60% relief with this regimen. Measuring clinical outcomes of chronic pain patients: score 12/28; the lower the number the better the outcome. Pain Meds and Quality Of Life have been reviewed. Nonpharmacologic therapy and non-opioid pharmacologic therapy were considered. If opioid therapy is prescribed, this is only if the expected benefits are anticipated to outweigh risks. She  is otherwise doing well with no other complaints today. She denies any adverse events including nausea, vomiting, dizziness, increased constipation, hallucinations, or seizures. The patient reports functional improvement and QOL with pain medication. Vitals:    11/20/17 1046   BP: 170/83   Pulse: 61   Resp: 18   Temp: 97 °F (36.1 °C)   TempSrc: Oral   Weight: 82.6 kg (182 lb)   Height: 5' 5\" (1.651 m)   PainSc:   7   PainLoc: Leg         No Known Allergies    Past Surgical History:   Procedure Laterality Date    HX HYSTERECTOMY      HX OTHER SURGICAL      surgical repair for hydronephrosis, right side    HX OTHER SURGICAL  11-    antrectomy with Billroth II anastomosis       ROS   Constitutional: Positive for malaise/fatigue. Negative for chills, fever and weight loss. HENT: Negative for ear pain, hearing loss, nosebleeds, sinus pain and sore throat. Seasonal allergies   Eyes: Negative for blurred vision, double vision and pain. Respiratory: Negative for cough, shortness of breath and wheezing. Cardiovascular: Positive for chest pain and leg swelling. Negative for palpitations. She has a hiatial hernia. Followed by PCP   Gastrointestinal: Positive for constipation and heartburn. Negative for diarrhea, nausea and vomiting. Relief for heartache with nexium  Constipation relief with OTC   Genitourinary: Negative for dysuria, frequency and urgency. Musculoskeletal: Positive for joint pain. Negative for back pain, falls, myalgias and neck pain. Skin: Negative for itching and rash. Neurological: Negative for dizziness, tingling, tremors, seizures, loss of consciousness, weakness and headaches. Psychiatric/Behavioral: Positive for depression. Negative for suicidal ideas. The patient is not nervous/anxious and does not have insomnia.         Physical Exam   Constitutional: She is oriented to person, place, and time and well-developed, well-nourished, and in no distress. She appears healthy. Non-toxic appearance. No distress. HENT:   Head: Normocephalic and atraumatic. Eyes: Right eye exhibits no discharge. Left eye exhibits no discharge. Neck: Neck supple. Pulmonary/Chest: Effort normal.   Musculoskeletal: She exhibits tenderness. Right hip: She exhibits decreased strength and tenderness. Left hip: She exhibits decreased strength and tenderness. Right knee: Tenderness found. Lumbar back: She exhibits tenderness and pain. Neurological: She is alert and oriented to person, place, and time. Skin: Skin is warm and dry. She is not diaphoretic. Psychiatric: Mood and affect normal.   Nursing note and vitals reviewed.     ASSESSMENT:    1. Degeneration of lumbar or lumbosacral intervertebral disc    2. Primary osteoarthritis involving multiple joints    3. Hip pain, left    4. Pain of left lower extremity    5. Facet arthropathy, lumbar    6. Chronic pain syndrome    7. Chronic abdominal pain    8. Encounter for long-term (current) use of high-risk medication        [place] Prescription Monitoring Program was reviewed which does not demonstrate aberrancies and/or inconsistencies with regard to the historical prescribing of controlled medications to this patient by other providers. Medications were brought to visit today. Pill count was appropriate. When possible, non-drug therapy for chronic pain should be used as a first-line treatment.  Physical therapy exercise regimens, chiropractic manipulation, meditation relaxation techniques, cognitive behavior therapy, acupuncture, yoga, Ned Chi,  transcutaneous electrical nerve stimulation (TENS), and application of moist heat can help alleviate pain . Explained that realistic expectations and goals with chronic pain management are to maximize function and minimize pain with the understanding that limitations will exist both in the extent of relief that she may achieve, as well as thresholds of mg strengths that we will not exceed. Our role is to help the patient better cope with chronic pain utilizng a multimodal approach. The patients condition and plan were discussed. All questions were answered. The patient agrees with the plan. PLAN / Pt Instructions:  1. Continue current plan with no evidence of addiction or diversion. 2. Stable on current medication without adverse events. 3. Refilled Morphine IR 15 mg tab, take 2 tablets four times daily (does not prefer to take 30 mg tablets)  4. Refill gabapentin 300 mg capsule, take 1 capsule nightly  5. Discussed risks of addiction, dependency, and opioid induced hyperalgesia. 6. Reviewed with patient benefits of home exercise, and lifestyle changes to assist the patient in self-management of symptoms. 7. Return to clinic in 3 months       Medications Ordered Today   Medications    DISCONTD: morphine IR (MS IR) 15 mg tablet     Sig: Take 2 Tabs by mouth four (4) times daily for 30 days. Max Daily Amount: 120 mg. for chronic, severe, refractory pain. Indications: Pain, Severe Pain     Dispense:  240 Tab     Refill:  0    DISCONTD: morphine IR (MS IR) 15 mg tablet     Sig: Take 2 Tabs by mouth four (4) times daily for 30 days. Max Daily Amount: 120 mg. for chronic, severe, refractory pain. Indications: Pain, Severe Pain     Dispense:  240 Tab     Refill:  0    DISCONTD: morphine IR (MS IR) 15 mg tablet     Sig: Take 2 Tabs by mouth four (4) times daily for 30 days. Max Daily Amount: 120 mg. for chronic, severe, refractory pain.   Indications: Pain, Severe Pain     Dispense:  240 Tab     Refill:  0    gabapentin (NEURONTIN) 300 mg capsule     Sig: Take 1 Cap by mouth once for 1 dose. Every night  Indications: NEUROPATHIC PAIN     Dispense:  30 Cap     Refill:  2    morphine IR (MS IR) 15 mg tablet     Sig: Take 2 Tabs by mouth four (4) times daily for 30 days. Max Daily Amount: 120 mg. for chronic, severe, refractory pain. Indications: Pain, Severe Pain     Dispense:  240 Tab     Refill:  0    morphine IR (MS IR) 15 mg tablet     Sig: Take 2 Tabs by mouth four (4) times daily for 30 days. Max Daily Amount: 120 mg. for chronic, severe, refractory pain. Indications: Pain, Severe Pain     Dispense:  240 Tab     Refill:  0    morphine IR (MS IR) 15 mg tablet     Sig: Take 2 Tabs by mouth four (4) times daily for 30 days. Max Daily Amount: 120 mg. for chronic, severe, refractory pain. Indications: Pain, Severe Pain     Dispense:  240 Tab     Refill:  0         Prescription monitoring program reviewed. Pain medications prescribed with the objective of pain relief and improved physical and psychosocial function in this patient. DISPOSITION  · Counseled patient on proper use of prescribed medications. · Counseled patient about chronic medical conditions and their relationship to anxiety and depression and recommended mental health support as needed. · Reviewed with patient self-help tools, home exercise, and lifestyle changes to assist the patient in self-management of symptoms. · Reviewed with patient the treatment plan, goals of treatment plan, and limitations of treatment plan, to include the potential for side effects from medications and procedures. If side effects occur, it is the responsibility of the patient to inform the clinic so that a change in the treatment plan can be made in a safe manner. The patient is advised that stopping prescribed medication may cause an increase in symptoms and possible medication withdrawal symptoms.  The patient is informed an emergency room evaluation may be necessary if this occurs. Spent 25 minutes with patient today reviewing the treatment plan, goals of treatment plan, and limitations of the treatment plan, to include the potential for side effects from medications and procedures. More than 50% of the visit time was spent counseling the patient. Juventino Galeano PA-C 11/20/2017        Note: Please excuse any typographical errors. Voice recognition software was used for this note and may cause mistakes.

## 2017-11-20 NOTE — MR AVS SNAPSHOT
Visit Information Date & Time Provider Department Dept. Phone Encounter #  
 11/20/2017 10:40 AM Raffaele Rachel [address] for Pain Management  Follow-up Instructions Return in about 3 months (around 2/20/2018). Upcoming Health Maintenance Date Due DTaP/Tdap/Td series (1 - Tdap) 12/21/1967 BREAST CANCER SCRN MAMMOGRAM 12/21/1996 FOBT Q 1 YEAR AGE 50-75 12/21/1996 ZOSTER VACCINE AGE 60> 10/21/2006 GLAUCOMA SCREENING Q2Y 12/21/2011 OSTEOPOROSIS SCREENING (DEXA) 12/21/2011 Pneumococcal 65+ Low/Medium Risk (1 of 2 - PCV13) 12/21/2011 MEDICARE YEARLY EXAM 12/21/2011 Influenza Age 5 to Adult 8/1/2017 Allergies as of 11/20/2017  Review Complete On: 11/20/2017 By: Jeana Leal PA-C No Known Allergies Current Immunizations  Never Reviewed No immunizations on file. Not reviewed this visit You Were Diagnosed With   
  
 Codes Comments Degeneration of lumbar or lumbosacral intervertebral disc    -  Primary ICD-10-CM: M51.37 
ICD-9-CM: 722.52 Primary osteoarthritis involving multiple joints     ICD-10-CM: M15.0 ICD-9-CM: 715.09 Hip pain, left     ICD-10-CM: M25.552 ICD-9-CM: 719.45 Pain of left lower extremity     ICD-10-CM: M79.605 ICD-9-CM: 729.5 Facet arthropathy, lumbar     ICD-10-CM: M12.88 ICD-9-CM: 095. 3 Chronic pain syndrome     ICD-10-CM: G89.4 ICD-9-CM: 338. 4 Chronic abdominal pain     ICD-10-CM: R10.9, G89.29 ICD-9-CM: 789.00, 338.29 Encounter for long-term (current) use of high-risk medication     ICD-10-CM: Z79.899 ICD-9-CM: V58.69 Vitals BP Pulse Temp Resp Height(growth percentile) Weight(growth percentile) 170/83 (BP 1 Location: Right arm, BP Patient Position: Sitting) 61 97 °F (36.1 °C) (Oral) 18 5' 5\" (1.651 m) 182 lb (82.6 kg) BMI OB Status Smoking Status 30.29 kg/m2 Hysterectomy Former Smoker BMI and BSA Data Body Mass Index Body Surface Area  
 30.29 kg/m 2 1.95 m 2 Preferred Pharmacy Pharmacy Name Phone [address], [address] [facility] [phone] Your Updated Medication List  
  
   
This list is accurate as of: 11/20/17 12:10 PM.  Always use your most recent med list. ALDACTONE 25 mg tablet Generic drug:  spironolactone Take  by mouth daily. citalopram 20 mg tablet Commonly known as:  Debarah Dieter Take  by mouth daily. cloNIDine HCl 0.3 mg tablet Commonly known as:  CATAPRES Take 0.3 mg by mouth two (2) times a day. ESTRADIOL TRANSDERMAL PATCH 0.1 mg/24 hr  
Generic drug:  estradiol 1 Patch by TransDERmal route Every Saturday. FISH OIL 1,000 mg Cap Generic drug:  omega-3 fatty acids-vitamin e Take 1 Cap by mouth. * gabapentin 300 mg capsule Commonly known as:  NEURONTIN Take 1 Cap by mouth three (3) times daily. * gabapentin 300 mg capsule Commonly known as:  NEURONTIN Take 1 Cap by mouth once for 1 dose. Every night  Indications: NEUROPATHIC PAIN  
  
 levothyroxine 88 mcg tablet Commonly known as:  SYNTHROID Take 88 mcg by mouth Daily (before breakfast). LIPITOR 20 mg tablet Generic drug:  atorvastatin Take  by mouth daily. * morphine IR 15 mg tablet Commonly known as:  MS IR Take 2 Tabs by mouth four (4) times daily for 30 days. Max Daily Amount: 120 mg. for chronic, severe, refractory pain. Indications: Pain, Severe Pain * morphine IR 15 mg tablet Commonly known as:  MS IR Take 2 Tabs by mouth four (4) times daily for 30 days. Max Daily Amount: 120 mg. for chronic, severe, refractory pain. Indications: Pain, Severe Pain Start taking on:  12/4/2017 * morphine IR 15 mg tablet Commonly known as:  MS IR Take 2 Tabs by mouth four (4) times daily for 30 days.  Max Daily Amount: 120 mg. for chronic, severe, refractory pain. Indications: Pain, Severe Pain Start taking on:  1/3/2018 * morphine IR 15 mg tablet Commonly known as:  MS IR Take 2 Tabs by mouth four (4) times daily for 30 days. Max Daily Amount: 120 mg. for chronic, severe, refractory pain. Indications: Pain, Severe Pain Start taking on:  2/2/2018  
  
 naloxone 4 mg/actuation nasal spray Commonly known as:  NARCAN  
4 mg by Nasal route as needed for up to 2 doses. Indications: OPIOID TOXICITY NexIUM 20 mg capsule Generic drug:  esomeprazole Take  by mouth daily. SINEMET  mg per tablet Generic drug:  carbidopa-levodopa Take 1 Tab by mouth three (3) times daily. spironolactone-hydrochlorothiazide 25-25 mg per tablet Commonly known as:  ALDACTAZIDE Take 1 Tab by mouth daily. temazepam 30 mg capsule Commonly known as:  RESTORIL Take  by mouth nightly as needed. VITAMIN D2 50,000 unit capsule Generic drug:  ergocalciferol TAKE 1 CAPSULE BY MOUTH 2 TIMES A WEEK AS DIRECTED  
  
 XANAX PO Take  by mouth. * Notice: This list has 6 medication(s) that are the same as other medications prescribed for you. Read the directions carefully, and ask your doctor or other care provider to review them with you. Prescriptions Printed Refills  
 morphine IR (MS IR) 15 mg tablet 0 Starting on: 2/2/2018 Sig: Take 2 Tabs by mouth four (4) times daily for 30 days. Max Daily Amount: 120 mg. for chronic, severe, refractory pain. Indications: Pain, Severe Pain Class: Print Route: Oral  
 morphine IR (MS IR) 15 mg tablet 0 Starting on: 1/3/2018 Sig: Take 2 Tabs by mouth four (4) times daily for 30 days. Max Daily Amount: 120 mg. for chronic, severe, refractory pain. Indications: Pain, Severe Pain Class: Print Route: Oral  
 morphine IR (MS IR) 15 mg tablet 0 Starting on: 12/4/2017 Sig: Take 2 Tabs by mouth four (4) times daily for 30 days. Max Daily Amount: 120 mg. for chronic, severe, refractory pain. Indications: Pain, Severe Pain Class: Print Route: Oral  
  
Prescriptions Sent to Pharmacy Refills  
 gabapentin (NEURONTIN) 300 mg capsule 2 Sig: Take 1 Cap by mouth once for 1 dose. Every night  Indications: NEUROPATHIC PAIN Class: Normal  
 Pharmacy: [id number] [facility], [address] [facility] #: [phone] Route: Oral  
  
Follow-up Instructions Return in about 3 months (around 2/20/2018). Introducing [facility]! Deirdre Damon introduces [facility] patient portal. Now you can access parts of your medical record, email your doctor's office, and request medication refills online. 1. In your internet browser, go to [email or web address] 2. Click on the First Time User? Click Here link in the Sign In box. You will see the New Member Sign Up page. 3. Enter your [facility] Access Code exactly as it appears below. You will not need to use this code after youve completed the sign-up process. If you do not sign up before the expiration date, you must request a new code. · [facility] Access Code: [email or web address] Expires: 2/18/2018 12:09 PM 
 
4. Enter the last four digits of your Social Security Number (xxxx) and Date of Birth (mm/dd/yyyy) as indicated and click Submit. You will be taken to the next sign-up page. 5. Create a [employer] ID. This will be your [facility] login ID and cannot be changed, so think of one that is secure and easy to remember. 6. Create a [facility] password. You can change your password at any time. 7. Enter your Password Reset Question and Answer. This can be used at a later time if you forget your password. 8. Enter your e-mail address. You will receive e-mail notification when new information is available in [address] 9. Click Sign Up. You can now view and download portions of your medical record. 10. Click the Download Summary menu link to download a portable copy of your medical information. If you have questions, please visit the Frequently Asked Questions section of the [employer] website. Remember, [employer] is NOT to be used for urgent needs. For medical emergencies, dial 911. Now available from your iPhone and Android! Please provide this summary of care documentation to your next provider. Your primary care clinician is listed as Deepali Taylor. If you have any questions after today's visit, please call [phone].

## 2018-02-15 ENCOUNTER — OFFICE VISIT (OUTPATIENT)
Dept: PAIN MANAGEMENT | Age: 73
End: 2018-02-15

## 2018-02-15 VITALS
RESPIRATION RATE: 16 BRPM | WEIGHT: 200 LBS | HEART RATE: 72 BPM | TEMPERATURE: 97 F | BODY MASS INDEX: 33.32 KG/M2 | SYSTOLIC BLOOD PRESSURE: 177 MMHG | HEIGHT: 65 IN | DIASTOLIC BLOOD PRESSURE: 91 MMHG

## 2018-02-15 DIAGNOSIS — F43.21 GRIEF REACTION: ICD-10-CM

## 2018-02-15 DIAGNOSIS — M51.37 DEGENERATION OF LUMBAR OR LUMBOSACRAL INTERVERTEBRAL DISC: Primary | ICD-10-CM

## 2018-02-15 DIAGNOSIS — M15.9 PRIMARY OSTEOARTHRITIS INVOLVING MULTIPLE JOINTS: ICD-10-CM

## 2018-02-15 DIAGNOSIS — M25.552 HIP PAIN, LEFT: ICD-10-CM

## 2018-02-15 DIAGNOSIS — Z79.899 ENCOUNTER FOR LONG-TERM (CURRENT) USE OF HIGH-RISK MEDICATION: ICD-10-CM

## 2018-02-15 DIAGNOSIS — R10.9 CHRONIC ABDOMINAL PAIN: ICD-10-CM

## 2018-02-15 DIAGNOSIS — G89.29 CHRONIC ABDOMINAL PAIN: ICD-10-CM

## 2018-02-15 DIAGNOSIS — M79.2 NEURALGIA: ICD-10-CM

## 2018-02-15 DIAGNOSIS — M79.605 PAIN OF LEFT LOWER EXTREMITY: ICD-10-CM

## 2018-02-15 DIAGNOSIS — G89.4 CHRONIC PAIN SYNDROME: ICD-10-CM

## 2018-02-15 RX ORDER — MORPHINE SULFATE 15 MG/1
30 TABLET ORAL 4 TIMES DAILY
Qty: 240 TAB | Refills: 0 | Status: SHIPPED | OUTPATIENT
Start: 2018-03-01 | End: 2018-03-31

## 2018-02-15 RX ORDER — METFORMIN HYDROCHLORIDE 500 MG/1
500 TABLET ORAL 2 TIMES DAILY WITH MEALS
COMMUNITY
End: 2022-11-02

## 2018-02-15 RX ORDER — MORPHINE SULFATE 15 MG/1
30 TABLET ORAL 4 TIMES DAILY
Qty: 240 TAB | Refills: 0 | Status: SHIPPED | OUTPATIENT
Start: 2018-04-30 | End: 2018-05-30

## 2018-02-15 RX ORDER — MORPHINE SULFATE 15 MG/1
30 TABLET ORAL 4 TIMES DAILY
Qty: 240 TAB | Refills: 0 | Status: SHIPPED | OUTPATIENT
Start: 2018-03-31 | End: 2018-05-14 | Stop reason: SDUPTHER

## 2018-02-15 NOTE — PROGRESS NOTES
Nursing Notes    Patient presents to the office today in follow-up. Patient rates her pain at 8/10 on the numerical pain scale. Reviewed medications with counts as follows:    Rx Date filled Qty Dispensed Pill Count Last Dose Short   Morphine Sulfate 15 mg tab 2/2/18 240 141 today no                                        Comments:     POC UDS was performed in office today  Per verbal order and correct read back from provider. Any new labs or imaging since last appointment? YES, Lab work     Have you been to an emergency room (ER) or urgent care clinic since your last visit? YES, ER. Have you been hospitalized since your last visit? NO     If yes, where, when, and reason for visit? Have you seen or consulted any other health care providers outside of the [address] [place] [address]  since your last visit? Christy Qureshi Dr, and PCP. If yes, where, when, and reason for visit? HM deferred to pcp.

## 2018-02-15 NOTE — MR AVS SNAPSHOT
[address] [facility] [address] [phone] Patient: Mari Swanson MRN: [id number] :1945 Visit Information Date & Time Provider Department Dept. Phone Encounter #  
 2/15/2018 10:40 AM Harvey [facility] [phone] Upcoming Health Maintenance Date Due DTaP/Tdap/Td series (1 - Tdap) 1967 BREAST CANCER SCRN MAMMOGRAM 1996 FOBT Q 1 YEAR AGE 50-75 1996 ZOSTER VACCINE AGE 60> 10/21/2006 GLAUCOMA SCREENING Q2Y 2011 OSTEOPOROSIS SCREENING (DEXA) 2011 Pneumococcal 65+ Low/Medium Risk (1 of 2 - PCV13) 2011 MEDICARE YEARLY EXAM 2011 Influenza Age 5 to Adult 2017 Allergies as of 2/15/2018  Review Complete On: 2/15/2018 By: Mj Smiley PA-C No Known Allergies Current Immunizations  Never Reviewed No immunizations on file. Not reviewed this visit You Were Diagnosed With   
  
 Codes Comments Degeneration of lumbar or lumbosacral intervertebral disc    -  Primary ICD-10-CM: M51.37 
ICD-9-CM: 722.52 Neuralgia     ICD-10-CM: M79.2 ICD-9-CM: 729.2 Primary osteoarthritis involving multiple joints     ICD-10-CM: M15.0 ICD-9-CM: 715.09 Chronic pain syndrome     ICD-10-CM: G89.4 ICD-9-CM: 338. 4 Chronic abdominal pain     ICD-10-CM: R10.9, G89.29 ICD-9-CM: 789.00, 338.29 Grief reaction     ICD-10-CM: F43.20 ICD-9-CM: 309.0 Hip pain, left     ICD-10-CM: M25.552 ICD-9-CM: 719.45 Pain of left lower extremity     ICD-10-CM: M79.605 ICD-9-CM: 729.5 Encounter for long-term (current) use of high-risk medication     ICD-10-CM: Z79.899 ICD-9-CM: V58.69 Vitals BP Pulse Temp Resp Height(growth percentile) Weight(growth percentile)  (!) 177/91 (BP 1 Location: Right arm, BP Patient Position: Sitting) 72 97 °F (36.1 °C) (Oral) 16 5' 5\" (1.651 m) 200 lb (90.7 kg) BMI OB Status Smoking Status 33.28 kg/m2 Hysterectomy Former Smoker Vitals History BMI and BSA Data Body Mass Index Body Surface Area  
 33.28 kg/m 2 2.04 m 2 Preferred Pharmacy Pharmacy Name Phone [address], [address] [place] [address] [phone] Your Updated Medication List  
  
   
This list is accurate as of: 2/15/18 12:01 PM.  Always use your most recent med list. ALDACTONE 25 mg tablet Generic drug:  spironolactone Take  by mouth daily. citalopram 20 mg tablet Commonly known as:  Liseth Lynbrook Take  by mouth daily. cloNIDine HCl 0.3 mg tablet Commonly known as:  CATAPRES Take 0.3 mg by mouth two (2) times a day. ESTRADIOL TRANSDERMAL PATCH 0.1 mg/24 hr  
Generic drug:  estradiol 1 Patch by TransDERmal route Every Saturday. FISH OIL 1,000 mg Cap Generic drug:  omega-3 fatty acids-vitamin e Take 1 Cap by mouth.  
  
 gabapentin 300 mg capsule Commonly known as:  NEURONTIN Take 1 Cap by mouth three (3) times daily. levothyroxine 88 mcg tablet Commonly known as:  SYNTHROID Take 88 mcg by mouth Daily (before breakfast). LIPITOR 20 mg tablet Generic drug:  atorvastatin Take  by mouth daily. metFORMIN 500 mg tablet Commonly known as:  GLUCOPHAGE Take  by mouth two (2) times daily (with meals). * morphine IR 15 mg tablet Commonly known as:  MS IR Take 2 Tabs by mouth four (4) times daily for 30 days. Max Daily Amount: 120 mg. for chronic, severe, refractory pain. Indications: Pain, Severe Pain * morphine IR 15 mg tablet Commonly known as:  MS IR Take 2 Tabs by mouth four (4) times daily for 30 days. Max Daily Amount: 120 mg. for chronic, severe, refractory pain. Indications: Pain, Severe Pain Start taking on:  3/1/2018 * morphine IR 15 mg tablet Commonly known as:  MS IR Take 2 Tabs by mouth four (4) times daily for 30 days. Max Daily Amount: 120 mg. for chronic, severe, refractory pain. Indications: Pain, Severe Pain Start taking on:  3/31/2018 * morphine IR 15 mg tablet Commonly known as:  MS IR Take 2 Tabs by mouth four (4) times daily for 30 days. Max Daily Amount: 120 mg. for chronic, severe, refractory pain. Indications: Pain, Severe Pain Start taking on:  4/30/2018  
  
 naloxone 4 mg/actuation nasal spray Commonly known as:  NARCAN  
4 mg by Nasal route as needed for up to 2 doses. Indications: OPIOID TOXICITY NexIUM 20 mg capsule Generic drug:  esomeprazole Take  by mouth daily. SINEMET  mg per tablet Generic drug:  carbidopa-levodopa Take 1 Tab by mouth three (3) times daily. spironolactone-hydrochlorothiazide 25-25 mg per tablet Commonly known as:  ALDACTAZIDE Take 1 Tab by mouth daily. temazepam 30 mg capsule Commonly known as:  RESTORIL Take  by mouth nightly as needed. VITAMIN D2 50,000 unit capsule Generic drug:  ergocalciferol TAKE 1 CAPSULE BY MOUTH 2 TIMES A WEEK AS DIRECTED  
  
 XANAX PO Take  by mouth. * Notice: This list has 4 medication(s) that are the same as other medications prescribed for you. Read the directions carefully, and ask your doctor or other care provider to review them with you. Prescriptions Printed Refills  
 morphine IR (MS IR) 15 mg tablet 0 Starting on: 4/30/2018 Sig: Take 2 Tabs by mouth four (4) times daily for 30 days. Max Daily Amount: 120 mg. for chronic, severe, refractory pain. Indications: Pain, Severe Pain Class: Print Route: Oral  
 morphine IR (MS IR) 15 mg tablet 0 Starting on: 3/31/2018 Sig: Take 2 Tabs by mouth four (4) times daily for 30 days. Max Daily Amount: 120 mg. for chronic, severe, refractory pain. Indications: Pain, Severe Pain Class: Print  Route: Oral  
 morphine IR (MS IR) 15 mg tablet 0 Starting on: 3/1/2018 Sig: Take 2 Tabs by mouth four (4) times daily for 30 days. Max Daily Amount: 120 mg. for chronic, severe, refractory pain. Indications: Pain, Severe Pain Class: Print Route: Oral  
  
We Performed the Following AMB POC DRUG SCREEN () [ [facility]] DRUG SCREEN [[id number] Custom] Introducing hospitals & HEALTH SERVICES! [employer] introduces [employer]Best patient portal. Now you can access parts of your medical record, email your doctor's office, and request medication refills online. 1. In your internet browser, go to [email or web address] 2. Click on the First Time User? Click Here link in the Sign In box. You will see the New Member Sign Up page. 3. Enter your FirstBest Access Code exactly as it appears below. You will not need to use this code after youve completed the sign-up process. If you do not sign up before the expiration date, you must request a new code. · FirstBest Access Code: [email or web address] Expires: 2/18/2018 12:09 PM 
 
4. Enter the last four digits of your Social Security Number (xxxx) and Date of Birth (mm/dd/yyyy) as indicated and click Submit. You will be taken to the next sign-up page. 5. Create a FirstBest ID. This will be your FirstBest login ID and cannot be changed, so think of one that is secure and easy to remember. 6. Create a FirstBest password. You can change your password at any time. 7. Enter your Password Reset Question and Answer. This can be used at a later time if you forget your password. 8. Enter your e-mail address. You will receive e-mail notification when new information is available in [address] 9. Click Sign Up. You can now view and download portions of your medical record. 10. Click the Download Summary menu link to download a portable copy of your medical information.  
 
If you have questions, please visit the Frequently Asked Questions section of the [employer]. Remember, sfilatinohart is NOT to be used for urgent needs. For medical emergencies, dial 911. Now available from your iPhone and Android! Please provide this summary of care documentation to your next provider. Your primary care clinician is listed as Leonardo Yee. If you have any questions after today's visit, please call [phone].

## 2018-02-15 NOTE — PROGRESS NOTES
HISTORY OF PRESENT ILLNESS  Nelson Gotti is a 71 y.o. female    Ms. Nancy Olivera presents today for follow up of chronic, severe pain in the lumbar spine with radiation to the left hip and down the anterior left thigh into the calf and foot due to lumbar DDD and spondylosis versus nerve damage following a complicated hysterectomy in 1988. No history of prior surgeries to the back. She has had history of injections and physical therapy with poor results. The patient denies any significant changes in her chronic pain since last f/u. She reports that she was in the hospital for high blood sugar level of 500. She reports this scared her tremendously. She reports that she was told in the hospital that if she had not gone to the emergency room when she did, \"she would have been in a coma in 48 hours. \"  She reports she will follow up with her PCP tomorrow to work on education about diabetes and  appropriate self-management. She will also discuss her elevated blood pressure at that time. She tolerates her medications without side effects. Nelson Gotti reports no change in constipation. We discussed the number of tablets she takes daily. She prefers to take two 15 mg morphine extended release tablets 4 times a day instead of one 30 mg tablet 4 times a day. I have informed her that this may change in the future. We discussed her current condition and medications in detail today. She tolerates medications without side effects. Nelson Gotti reports no change in sleep or constipation. Current medication management consists of: Morphine IR 15 mg tab, take 2 tablets  four times daily, gabapentin 300 mg capsule, 1 capsule nightly. She continues to complain of persistent pain in the buttocks (L>R) and lower back. She continues to describe her pain as aching, shooting, burning and tender. Pain predominates down the front of the left leg and in bilateral hips and knees.  This pain began abruptly after a complicated hysterectomy 25 years ago. Pain worsens with prolonged sitting and standing. Pt reports average of 7/10 pain scale most days. She denies any new symptoms. The patient reports an average of 60% relief with this regimen. Measuring clinical outcomes of chronic pain patients: score 14/28; the lower the number the better the outcome. Pain Meds and Quality Of Life have been reviewed. Nonpharmacologic therapy and non-opioid pharmacologic therapy were considered. If opioid therapy is prescribed, this is only if the expected benefits are anticipated to outweigh risks. She  is otherwise doing well with no other complaints today. She denies any adverse events including nausea, vomiting, dizziness, increased constipation, hallucinations, or seizures. The patient reports functional improvement and QOL with pain medication. Vitals:    02/15/18 1122   BP: (!) 177/91   Pulse: 72   Resp: 16   Temp: 97 °F (36.1 °C)   TempSrc: Oral   Weight: 90.7 kg (200 lb)   Height: 5' 5\" (1.651 m)   PainSc:   8   PainLoc: Leg         No Known Allergies    Past Surgical History:   Procedure Laterality Date    HX HYSTERECTOMY      HX OTHER SURGICAL      surgical repair for hydronephrosis, right side    HX OTHER SURGICAL  11-    antrectomy with Billroth II anastomosis         ROS   Constitutional: Positive for malaise/fatigue. Negative for chills, fever and weight loss. HENT: Negative for ear pain, hearing loss, nosebleeds, sinus pain and sore throat.         Seasonal allergies   Eyes: Negative for blurred vision, double vision and pain. Respiratory: Negative for cough, shortness of breath and wheezing.    Cardiovascular: Positive for chest pain and leg swelling. Negative for palpitations.        She has a hiatial hernia. Followed by PCP   Gastrointestinal: Positive for constipation and heartburn.  Negative for diarrhea, nausea and vomiting.        Relief for heartache with nexium  Constipation relief with OTC   Genitourinary: Negative for dysuria, frequency and urgency. Musculoskeletal: Positive for joint pain. Negative for back pain, falls, myalgias and neck pain. Skin: Negative for itching and rash. Neurological: Negative for dizziness, tingling, tremors, seizures, loss of consciousness, weakness and headaches. Psychiatric/Behavioral: Positive for depression. Negative for suicidal ideas. The patient is not nervous/anxious and does not have insomnia.          Physical Exam   Constitutional: She is oriented to person, place, and time and well-developed, well-nourished, and in no distress. She appears healthy.  Non-toxic appearance. No distress. HENT:   Head: Normocephalic and atraumatic. Eyes: Right eye exhibits no discharge. Left eye exhibits no discharge. Neck: Neck supple. Pulmonary/Chest: Effort normal.   Musculoskeletal: She exhibits tenderness.        Right hip: She exhibits decreased strength and tenderness.        Left hip: She exhibits decreased strength and tenderness.        Right knee: Tenderness found.        Lumbar back: She exhibits tenderness and pain. Neurological: She is alert and oriented to person, place, and time. Skin: Skin is warm and dry. She is not diaphoretic. Psychiatric: Mood and affect normal.   Nursing note and vitals reviewed. ASSESSMENT:    1. Degeneration of lumbar or lumbosacral intervertebral disc    2. Neuralgia    3. Primary osteoarthritis involving multiple joints    4. Chronic pain syndrome    5. Chronic abdominal pain    6. Grief reaction    7. Hip pain, left    8. Pain of left lower extremity    9. Encounter for long-term (current) use of high-risk medication          [place] Prescription Monitoring Program was reviewed which does not demonstrate aberrancies and/or inconsistencies with regard to the historical prescribing of controlled medications to this patient by other providers. Medications were brought to visit today.  Pill count was appropriate. When possible, non-drug therapy for chronic pain should be used as a first-line treatment. Physical therapy exercise regimens, chiropractic manipulation, meditation relaxation techniques, cognitive behavior therapy, acupuncture, yoga, Ned Chi,  transcutaneous electrical nerve stimulation (TENS), and application of moist heat can help alleviate pain . Explained that realistic expectations and goals with chronic pain management are to maximize function and minimize pain with the understanding that limitations will exist both in the extent of relief that she may achieve, as well as thresholds of mg strengths that we will not exceed. Our role is to help the patient better cope with chronic pain utilizng a multimodal approach. The patients condition and plan were discussed. All questions were answered. The patient agrees with the plan. PLAN / Pt Instructions:  1. Continue current plan with no evidence of addiction or diversion. 2. Stable on current medication without adverse events. 3. Refilled Morphine IR 15 mg tab, take 2 tablets four times daily (does not prefer to take 30 mg tablets)  4. Refill gabapentin 300 mg capsule, take 1 capsule nightly  5. Discussed risks of addiction, dependency, and opioid induced hyperalgesia. 6. Reviewed with patient benefits of home exercise, and lifestyle changes to assist the patient in self-management of symptoms. 7. Return to clinic in 3 months       Prescription monitoring program reviewed. The patient  should keep track of total Tylenol intake and make sure liver function tests have been checked with primary care physician. POC UDS today. Pain medications prescribed with the objective of pain relief and improved physical and psychosocial function in this patient. DISPOSITION  · Counseled patient on proper use of prescribed medications.   · Counseled patient about chronic medical conditions and their relationship to anxiety and depression and recommended mental health support as needed. · Reviewed with patient self-help tools, home exercise, and lifestyle changes to assist the patient in self-management of symptoms. · Reviewed with patient the treatment plan, goals of treatment plan, and limitations of treatment plan, to include the potential for side effects from medications and procedures. If side effects occur, it is the responsibility of the patient to inform the clinic so that a change in the treatment plan can be made in a safe manner. The patient is advised that stopping prescribed medication may cause an increase in symptoms and possible medication withdrawal symptoms. The patient is informed an emergency room evaluation may be necessary if this occurs. Spent 25 minutes with patient today reviewing the treatment plan, goals of treatment plan, and limitations of the treatment plan, to include the potential for side effects from medications and procedures. More than 50% of the visit time was spent counseling the patient. Toney Dee PA-C 2/15/2018        Note: Please excuse any typographical errors. Voice recognition software was used for this note and may cause mistakes.

## 2018-04-18 RX ORDER — GABAPENTIN 300 MG/1
CAPSULE ORAL
Qty: 30 CAP | Refills: 2 | Status: SHIPPED | OUTPATIENT
Start: 2018-04-18 | End: 2018-05-14 | Stop reason: SDUPTHER

## 2018-05-14 ENCOUNTER — OFFICE VISIT (OUTPATIENT)
Dept: PAIN MANAGEMENT | Age: 73
End: 2018-05-14

## 2018-05-14 VITALS
BODY MASS INDEX: 33.32 KG/M2 | TEMPERATURE: 98.6 F | HEIGHT: 65 IN | RESPIRATION RATE: 18 BRPM | WEIGHT: 200 LBS | DIASTOLIC BLOOD PRESSURE: 89 MMHG | SYSTOLIC BLOOD PRESSURE: 140 MMHG | HEART RATE: 73 BPM

## 2018-05-14 DIAGNOSIS — M79.2 NEURALGIA: ICD-10-CM

## 2018-05-14 DIAGNOSIS — G89.29 CHRONIC ABDOMINAL PAIN: ICD-10-CM

## 2018-05-14 DIAGNOSIS — R10.9 CHRONIC ABDOMINAL PAIN: ICD-10-CM

## 2018-05-14 DIAGNOSIS — G89.4 CHRONIC PAIN SYNDROME: Primary | ICD-10-CM

## 2018-05-14 DIAGNOSIS — M15.9 PRIMARY OSTEOARTHRITIS INVOLVING MULTIPLE JOINTS: ICD-10-CM

## 2018-05-14 DIAGNOSIS — M51.37 DEGENERATION OF LUMBAR OR LUMBOSACRAL INTERVERTEBRAL DISC: ICD-10-CM

## 2018-05-14 DIAGNOSIS — M47.816 FACET ARTHROPATHY, LUMBAR: ICD-10-CM

## 2018-05-14 RX ORDER — MORPHINE SULFATE 15 MG/1
30 TABLET ORAL 4 TIMES DAILY
Qty: 240 TAB | Refills: 0 | Status: SHIPPED | OUTPATIENT
Start: 2018-05-29 | End: 2018-06-28

## 2018-05-14 NOTE — PROGRESS NOTES
Nursing Notes    Patient presents to the office today in follow-up. Patient rates her pain at 9/10 on the numerical pain scale. Reviewed medications with counts as follows:    Rx Date filled Qty Dispensed Pill Count Last Dose Short   Morphine 15 mg 04/30/18 240 124 This a.m no                                        POC UDS was not performed in office today    Any new labs or imaging since last appointment? NO    Have you been to an emergency room (ER) or urgent care clinic since your last visit? NO            Have you been hospitalized since your last visit? NO     If yes, where, when, and reason for visit? Have you seen or consulted any other health care providers outside of the [address] [place] [address]  since your last visit? NO     If yes, where, when, and reason for visit? Ms. Alina Rodriguez has a reminder for a \"due or due soon\" health maintenance. I have asked that she contact her primary care provider for follow-up on this health maintenance.

## 2018-05-14 NOTE — MR AVS SNAPSHOT
[address] [facility] [address] [phone] Patient: Sadaf Kam MRN: [id number] :1945 Visit Information Date & Time Provider Department Dept. Phone Encounter #  
 2018  2:00 PM Martha Diggs [address] [facility] [phone] Upcoming Health Maintenance Date Due DTaP/Tdap/Td series (1 - Tdap) 1967 BREAST CANCER SCRN MAMMOGRAM 1996 FOBT Q 1 YEAR AGE 50-75 1996 ZOSTER VACCINE AGE 60> 10/21/2006 GLAUCOMA SCREENING Q2Y 2011 Bone Densitometry (Dexa) Screening 2011 Pneumococcal 65+ Low/Medium Risk (1 of 2 - PCV13) 2011 MEDICARE YEARLY EXAM 3/14/2018 Influenza Age 5 to Adult 2018 Allergies as of 2018  Review Complete On: 2018 By: Laura Barrera PA-C No Known Allergies Current Immunizations  Never Reviewed No immunizations on file. Not reviewed this visit You Were Diagnosed With   
  
 Codes Comments Chronic pain syndrome    -  Primary ICD-10-CM: G89.4 ICD-9-CM: 338.4 Primary osteoarthritis involving multiple joints     ICD-10-CM: M15.0 ICD-9-CM: 715.09 Degeneration of lumbar or lumbosacral intervertebral disc     ICD-10-CM: M51.37 
ICD-9-CM: 722.52 Facet arthropathy, lumbar ([facility]     ICD-10-CM: M46.96 
ICD-9-CM: 371. 3 Chronic abdominal pain     ICD-10-CM: R10.9, G89.29 ICD-9-CM: 789.00, 338.29 Neuralgia     ICD-10-CM: M79.2 ICD-9-CM: 729.2 Vitals BP Pulse Temp Resp Height(growth percentile) Weight(growth percentile) 140/89 (BP 1 Location: Right arm, BP Patient Position: Sitting) 73 98.6 °F (37 °C) (Oral) 18 5' 5\" (1.651 m) 200 lb (90.7 kg) BMI OB Status Smoking Status 33.28 kg/m2 Hysterectomy Former Smoker Vitals History BMI and BSA Data  Body Mass Index Body Surface Area  
 33.28 kg/m 2 2.04 m 2  
  
  
 Preferred Pharmacy Pharmacy Name Phone [address], [address] [facility] [phone] Your Updated Medication List  
  
   
This list is accurate as of 5/14/18  3:39 PM.  Always use your most recent med list. ALDACTONE 25 mg tablet Generic drug:  spironolactone Take  by mouth daily. citalopram 20 mg tablet Commonly known as:  Cloyce Rafaela Take  by mouth daily. cloNIDine HCl 0.3 mg tablet Commonly known as:  CATAPRES Take 0.3 mg by mouth two (2) times a day. ESTRADIOL TRANSDERMAL PATCH 0.1 mg/24 hr  
Generic drug:  estradiol 1 Patch by TransDERmal route Every Saturday. FISH OIL 1,000 mg Cap Generic drug:  omega-3 fatty acids-vitamin e Take 1 Cap by mouth.  
  
 gabapentin 300 mg capsule Commonly known as:  NEURONTIN Take 1 Cap by mouth three (3) times daily. levothyroxine 88 mcg tablet Commonly known as:  SYNTHROID Take 88 mcg by mouth Daily (before breakfast). LIPITOR 20 mg tablet Generic drug:  atorvastatin Take  by mouth daily. metFORMIN 500 mg tablet Commonly known as:  GLUCOPHAGE Take  by mouth two (2) times daily (with meals). * morphine IR 15 mg tablet Commonly known as:  MS IR Take 2 Tabs by mouth four (4) times daily for 30 days. Max Daily Amount: 120 mg. for chronic, severe, refractory pain. Indications: Pain, Severe Pain * morphine IR 15 mg tablet Commonly known as:  MS IR Take 2 Tabs by mouth four (4) times daily for 30 days. Max Daily Amount: 120 mg. for chronic, severe, refractory pain. Indications: Pain, Severe Pain Start taking on:  5/29/2018  
  
 naloxone 4 mg/actuation nasal spray Commonly known as:  NARCAN  
4 mg by Nasal route as needed for up to 2 doses. Indications: OPIOID TOXICITY NexIUM 20 mg capsule Generic drug:  esomeprazole Take  by mouth daily. SINEMET  mg per tablet Generic drug:  carbidopa-levodopa Take 1 Tab by mouth three (3) times daily. spironolactone-hydrochlorothiazide 25-25 mg per tablet Commonly known as:  ALDACTAZIDE Take 1 Tab by mouth daily. temazepam 30 mg capsule Commonly known as:  RESTORIL Take  by mouth nightly as needed. VITAMIN D2 50,000 unit capsule Generic drug:  ergocalciferol TAKE 1 CAPSULE BY MOUTH 2 TIMES A WEEK AS DIRECTED  
  
 XANAX PO Take  by mouth. * Notice: This list has 2 medication(s) that are the same as other medications prescribed for you. Read the directions carefully, and ask your doctor or other care provider to review them with you. Prescriptions Printed Refills  
 morphine IR (MS IR) 15 mg tablet 0 Starting on: 5/29/2018 Sig: Take 2 Tabs by mouth four (4) times daily for 30 days. Max Daily Amount: 120 mg. for chronic, severe, refractory pain. Indications: Pain, Severe Pain Class: Print Route: Oral  
  
Introducing [facility]! [place] introduces [employer] patient portal. Now you can access parts of your medical record, email your doctor's office, and request medication refills online. 1. In your internet browser, go to [email or web address] 2. Click on the First Time User? Click Here link in the Sign In box. You will see the New Member Sign Up page. 3. Enter your FanDuel Access Code exactly as it appears below. You will not need to use this code after youve completed the sign-up process. If you do not sign up before the expiration date, you must request a new code. · FanDuel Access Code: [email or web address] Expires: 8/12/2018  3:39 PM 
 
4. Enter the last four digits of your Social Security Number (xxxx) and Date of Birth (mm/dd/yyyy) as indicated and click Submit. You will be taken to the next sign-up page. 5. Create a FanDuel ID. This will be your FanDuel login ID and cannot be changed, so think of one that is secure and easy to remember. 6. Create a Cmed password. You can change your password at any time. 7. Enter your Password Reset Question and Answer. This can be used at a later time if you forget your password. 8. Enter your e-mail address. You will receive e-mail notification when new information is available in [address] 9. Click Sign Up. You can now view and download portions of your medical record. 10. Click the Download Summary menu link to download a portable copy of your medical information. If you have questions, please visit the Frequently Asked Questions section of the Cmed website. Remember, Cmed is NOT to be used for urgent needs. For medical emergencies, dial 911. Now available from your iPhone and Android! Please provide this summary of care documentation to your next provider. Your primary care clinician is listed as Jason Oneill. If you have any questions after today's visit, please call [phone].

## 2018-05-14 NOTE — PROGRESS NOTES
HISTORY OF PRESENT ILLNESS  Ebony Dumont is a 71 y.o. female    Ms. Delmis Llanos presents today for follow up of chronic pain in the lumbar spine with radiation to the left hip and down the anterior left thigh into the calf and foot due to lumbar DDD and spondylosis versus nerve damage following a complicated hysterectomy in 1988.  No history of prior surgeries to the back.  She has had history of injections and physical therapy with poor results.       The patient denies any significant changes in her chronic pain since last f/u. She continues to follow up with her PCP for diabetes. She tolerates her medications without side effects. Patricia Merino reports no change in constipation. She uses over-the-counter medication for occasional constipation. We discussed her current condition and medications in detail today. Patient understands current practice transition and taper plan in future. Patient is planning on transferring her continued pain management care to another practice. She will sign a medical release form today. I will provide 1 month transition prescription. We will assist patient with any medical records transfer as needed. Current MME dose as of today:120    Current medication management consists of: Morphine IR 15 mg tab, take 2 tablets  four times daily, gabapentin 300 mg capsule, 1 capsule nightly. She continues to complain of persistent pain in the buttocks (L>R) and lower back. She continues to describe her pain as aching, shooting, burning and tender. Pain predominates down the front of the left leg and in bilateral hips and knees. This pain began abruptly after a complicated hysterectomy 25 years ago. Pain worsens with prolonged sitting and standing. Pt reports average of 7/10 pain scale most days. She denies any new symptoms. The patient reports an average of 60% relief with this regimen. Pain Meds and Quality Of Life have been reviewed. Nonpharmacologic therapy and non-opioid pharmacologic therapy were considered. If opioid therapy is prescribed, this is only if the expected benefits are anticipated to outweigh risks. She  is otherwise doing well with no other complaints today. She denies any adverse events including nausea, vomiting, dizziness, increased constipation, hallucinations, or seizures. The patient reports functional improvement and QOL with pain medication. Vitals:    05/14/18 1503 05/14/18 1504   BP: 144/90 140/89   Pulse: 73 73   Resp: 18    Temp: 98.6 °F (37 °C)    TempSrc: Oral    Weight: 90.7 kg (200 lb)    Height: 5' 5\" (1.651 m)    PainSc:   9    PainLoc: Leg          No Known Allergies    Past Surgical History:   Procedure Laterality Date    HX HYSTERECTOMY      HX OTHER SURGICAL      surgical repair for hydronephrosis, right side    HX OTHER SURGICAL  11-    antrectomy with Billroth II anastomosis         ROS   Constitutional: Positive for malaise/fatigue. Negative for chills, fever and weight loss. HENT: Negative for ear pain, hearing loss, nosebleeds, sinus pain and sore throat.         Seasonal allergies   Eyes: Negative for blurred vision, double vision and pain. Respiratory: Negative for cough, shortness of breath and wheezing.    Cardiovascular: Positive for chest pain and leg swelling. Negative for palpitations.        She has a hiatial hernia. Followed by PCP   Gastrointestinal: Positive for constipation and heartburn. Negative for diarrhea, nausea and vomiting.        Relief for heartache with nexium  Constipation relief with OTC   Genitourinary: Negative for dysuria, frequency and urgency. Musculoskeletal: Positive for joint pain. Negative for back pain, falls, myalgias and neck pain. Skin: Negative for itching and rash. Neurological: Negative for dizziness, tingling, tremors, seizures, loss of consciousness, weakness and headaches. Psychiatric/Behavioral: Positive for depression. Negative for suicidal ideas.  The patient is not nervous/anxious and does not have insomnia.        Physical Exam   Constitutional: She is oriented to person, place, and time and well-developed, well-nourished, and in no distress. She appears healthy.  Non-toxic appearance. No distress. HENT:   Head: Normocephalic and atraumatic. Eyes: Right eye exhibits no discharge. Left eye exhibits no discharge. Neck: Neck supple. Pulmonary/Chest: Effort normal.   Musculoskeletal: She exhibits tenderness.        Right hip: She exhibits decreased strength and tenderness.        Left hip: She exhibits decreased strength and tenderness.        Right knee: Tenderness found.        Lumbar back: She exhibits tenderness and pain. Neurological: She is alert and oriented to person, place, and time. Skin: Skin is warm and dry. She is not diaphoretic. Psychiatric: Mood and affect normal.   Nursing note and vitals reviewed. ASSESSMENT:    1. Chronic pain syndrome    2. Primary osteoarthritis involving multiple joints    3. Degeneration of lumbar or lumbosacral intervertebral disc    4. Facet arthropathy, lumbar ([address]    5. Chronic abdominal pain    6. Neuralgia          COMM:  Patient refused to fill out    [place] was reviewed which does not demonstrate aberrancies and/or inconsistencies with regard to the historical prescribing of controlled medications to this patient by other providers. Medications were brought to visit today. Pill count waS appropriate. When possible, non-drug therapy for chronic pain should be used as a first-line treatment. Physical therapy exercise regimens, chiropractic manipulation, meditation relaxation techniques, cognitive behavior therapy, acupuncture, yoga, Ned Chi,  transcutaneous electrical nerve stimulation (TENS), and application of moist heat can help alleviate pain .      Explained that realistic expectations and goals with chronic pain management are to maximize function and minimize pain with the understanding that limitations will exist both in the extent of relief that she may achieve, as well as thresholds of mg strengths that we will not exceed. Our role is to help the patient better cope with chronic pain utilizng a multimodal approach. The patients condition and plan were discussed. All questions were answered. The patient agrees with the plan. PLAN / Pt Instructions:  1. Continue current plan with no evidence of addiction or diversion. 2. Stable on current medication without adverse events. 3. Refilled Morphine IR 15 mg tab, take 2 tablets four times daily (does not prefer to take 30 mg tablets)  4. Refill gabapentin 300 mg capsule, take 1 capsule nightly  5. Discussed risks of addiction, dependency, and opioid induced hyperalgesia. 6. Reviewed with patient benefits of home exercise, and lifestyle changes to assist the patient in self-management of symptoms. 7. Patient plans on moving her pain management care to another provider. 8. She will tentatively schedule for 3 month follow-up. We will assist with medical records transfer as needed. Medications Ordered Today   Medications    morphine IR (MS IR) 15 mg tablet     Sig: Take 2 Tabs by mouth four (4) times daily for 30 days. Max Daily Amount: 120 mg. for chronic, severe, refractory pain. Indications: Pain, Severe Pain     Dispense:  240 Tab     Refill:  0         Prescription monitoring program reviewed. The patient  should keep track of total Tylenol intake and make sure liver function tests have been checked with primary care physician. POC UDS today. Pain medications prescribed with the objective of pain relief and improved physical and psychosocial function in this patient. DISPOSITION  · Counseled patient on proper use of prescribed medications.   · Counseled patient about chronic medical conditions and their relationship to anxiety and depression and recommended mental health support as needed. · Reviewed with patient self-help tools, home exercise, and lifestyle changes to assist the patient in self-management of symptoms. · Reviewed with patient the treatment plan, goals of treatment plan, and limitations of treatment plan, to include the potential for side effects from medications and procedures. If side effects occur, it is the responsibility of the patient to inform the clinic so that a change in the treatment plan can be made in a safe manner. The patient is advised that stopping prescribed medication may cause an increase in symptoms and possible medication withdrawal symptoms. The patient is informed an emergency room evaluation may be necessary if this occurs. Spent 25 minutes with patient today reviewing the treatment plan, goals of treatment plan, and limitations of the treatment plan, to include the potential for side effects from medications and procedures. More than 50% of the visit time was spent counseling the patient. Laura Barrera PA-C 5/14/2018        Note: Please excuse any typographical errors. Voice recognition software was used for this note and may cause mistakes.

## 2018-07-05 DIAGNOSIS — G89.4 CHRONIC PAIN SYNDROME: Primary | ICD-10-CM

## 2018-07-05 RX ORDER — MORPHINE SULFATE 15 MG/1
30 TABLET ORAL 4 TIMES DAILY
COMMUNITY
End: 2018-07-05 | Stop reason: SDUPTHER

## 2018-07-05 RX ORDER — MORPHINE SULFATE 15 MG/1
TABLET ORAL
Qty: 210 TAB | Refills: 0 | Status: ON HOLD | OUTPATIENT
Start: 2018-07-05 | End: 2021-09-24 | Stop reason: SDUPTHER

## 2018-07-05 NOTE — TELEPHONE ENCOUNTER
sJ Maldonado has called requesting a refill of their controlled medication Morphine 15 mg IR  for the management of chronic pain syndrome     Last office visit date: 05/14/18    Date last  was pulled and reviewed : 07/05/18    Was the patient compliant when the above report was pulled? yes    Analgesia: The patient states she has 80% relief with the pain meds    Aberrancies: There are no aberrancies at this time. ADL's: The patient states that she is able to do her adls while on her meds. Adverse Reaction: There are no adverse reactions at this time. Provider's last note and plan of care reviewed? yes  Request forwarded to provider for review.

## 2018-07-06 ENCOUNTER — TELEPHONE (OUTPATIENT)
Dept: PAIN MANAGEMENT | Age: 73
End: 2018-07-06

## 2019-09-05 NOTE — PROGRESS NOTES
2019       Zbigniew Ellison MD  [address]  [facility] [address]  [facility] [address]  VIA Facsimile: [phone]      Patient: Gokul Garcia   YOB: 1972   Date of Visit: 2019       Dear Dr. Ellison:    I saw your patient, Gokul Garcia, for an evaluation. Below are my notes for this visit with him.    If you have questions, please do not hesitate to call me.      Sincerely,        Rika Mendez MD        CC: No Recipients  Rika Mendez MD  2019  7:20 PM  Sign when Signing Visit  RHEUMATOLOGY INITIAL VISIT    Name: Gokul Garcia  : 1972  Date: 2019    Referring Physician:  Zbigniew Ellison MD                                       [place], [place]  Chaperone:  Unaccompanied  Translation Services:  Not required    SUBJECTIVE  46 year old  male referred for pain in his hands mainly the 2nd and 3rd MCP joints for 5 years.  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 His PCP injected his left 2nd MCP with slight improvement.  He has occasional pain in the left 1st MTP joint, left shoulder, and right knee. He presently denied back pain but he fell off a roof 17 years ago and sustained a vertebral fracture.  He has had some improvement in his joint pains with occasional Advil.  He denied fever, fatigue, or anorexia.  He had occasional rash on his extremities now resolved.  There was no psoriasis, photosensitivity, Raynaud's phenomenon, skin tightening, or digital ulcers.  He denied eye inflammation, oral ulcers, dry eyes, or dry mouth.  He had a history or nephrolithiasis on 2 occasions.  There was no history of thyroid disease, hepatitis or liver disease, seizure disorder, stroke, or thromboembolic events.          No past medical history on file.   No past surgical history on file.   Social History     Socioeconomic History   • Marital status: Not on file     Spouse name: Not on file   • Number of children: Not on file   • Years of education: Not on  Nursing Notes    Patient presents to the office today in follow-up. Patient rates her pain at 5/10 on the numerical pain scale. Reviewed medications with counts as follows:    Rx Date filled Qty Dispensed Pill Count Last Dose Short   MS IR 15 5/13/17 240 52 6/5/17 no         Comments:     POC UDS was not performed in office today    Any new labs or imaging since last appointment? NO    Have you been to an emergency room (ER) or urgent care clinic since your last visit? NO            Have you been hospitalized since your last visit? NO     If yes, where, when, and reason for visit? Have you seen or consulted any other health care providers outside of the [address] [place] [address]  since your last visit? NO     If yes, where, when, and reason for visit? Ms. Yosvany Cox has a reminder for a \"due or due soon\" health maintenance. I have asked that she contact her primary care provider for follow-up on this health maintenance. file   • Highest education level: Not on file   Occupational History   • Not on file   Social Needs   • Financial resource strain: Not on file   • Food insecurity:     Worry: Not on file     Inability: Not on file   • Transportation needs:     Medical: Not on file     Non-medical: Not on file   Tobacco Use   • Smoking status: Not on file   Substance and Sexual Activity   • Alcohol use: Not on file   • Drug use: Not on file   • Sexual activity: Not on file   Lifestyle   • Physical activity:     Days per week: Not on file     Minutes per session: Not on file   • Stress: Not on file   Relationships   • Social connections:     Talks on phone: Not on file     Gets together: Not on file     Attends Shinto service: Not on file     Active member of club or organization: Not on file     Attends meetings of clubs or organizations: Not on file     Relationship status: Not on file   • Intimate partner violence:     Fear of current or ex partner: Not on file     Emotionally abused: Not on file     Physically abused: Not on file     Forced sexual activity: Not on file   Other Topics Concern   • Not on file   Social History Narrative   • Not on file   .  No family history on file.       Allergies:  ALLERGIES: no known allergies.    No current outpatient medications on file.     No current facility-administered medications for this visit.        ROS:  Review of Systems   Constitutional: Negative for appetite change, fatigue and fever.   HENT: Negative for mouth sores.         No dry mouth.   Eyes: Negative for redness and visual disturbance.        No dry eyes.   Respiratory: Negative for cough and shortness of breath.    Cardiovascular: Negative for chest pain.   Gastrointestinal: Negative for abdominal pain, diarrhea, nausea and vomiting.        Occasional acid reflex.   Genitourinary: Negative for dysuria and frequency.   Neurological: Negative for seizures, weakness, numbness and headaches.   Hematological: Negative for  adenopathy.       OBJECTIVE  Visit Vitals  BP (!) 151/105 (BP Location: [facility], Patient Position: Sitting, Cuff Size: Regular) Comment (Cuff Size): refular long   Pulse 69   Temp 98.2 °F (36.8 °C) (Oral)   Ht 5' 9.09\" (1.755 m)   Wt 131.1 kg (289 lb)   BMI 42.56 kg/m²       Physical exam   Physical Exam   Constitutional: He is oriented to person, place, and time.   Cooperative healthy-looking mal in no acute distress.   HENT:   Mouth/Throat: Oropharynx is clear and moist.   No oral mucosal ulcers.   Eyes: Pupils are equal, round, and reactive to light. No scleral icterus.   No eye inflammation or dry eyes.   Neck: Neck supple. No thyromegaly present.   No salivary gland enlargement.   Cardiovascular: Normal heart sounds and intact distal pulses. Exam reveals no gallop.   No murmur heard.  No dependent edema.   Pulmonary/Chest: Breath sounds normal. He has no wheezes. He has no rales.   Abdominal: Soft. Bowel sounds are normal. He exhibits no mass. There is no tenderness. There is no guarding.   No hepatosplenomegaly. Musculoskeletal:      Comments: Hands revealed bony hypertrophy in the right 2nd MCP joint and the left 3rd MCP joint.  There was no other bony hypertrophy or synovitis in the remaining MCP and PIP joints.  No rheumatoid hand deformities.   strength normal.  No synovitis or restriction of movement in the wrists and elbows.  Full range of movement in his shoulders. Knees revealed no synovitis, effusions, tenderness, deformities, malalignment, or restriction of movement.  No synovitis ankles.  No tenderness MTP joints.  No deformites or dactylitis toes.  No subcutaneous nodules.  Axial spine no tenderness, deformity, or restricton of movement.     Lymphadenopathy:     He has no cervical adenopathy.   Neurological: He is alert and oriented to person, place, and time.   Normal muscle strength in the shoulder girdle, biceps, triceps, hand , and hip flexors.  DTRs 2+ symmetrically in the biceps, triceps,  knee jerks, and ankle jerks,.   Skin:   No rashes, purpura, acrocyanosis, psoriasis, other skin lesions. or       LAB RESULTS    Component      Latest Ref Rng & Units 2/18/2019   WBC      4.2 - 11.0 K/mcL 6.9   RBC      4.50 - 5.90 mil/mcL 5.34   HGB      13.0 - 17.0 g/dL 16.0   HCT      39.0 - 51.0 % 49.1   MCV      78.0 - 100.0 fl 91.9   MCH      26.0 - 34.0 pg 30.0   MCHC      32.0 - 36.5 g/dL 32.6   RDW-CV      11.0 - 15.0 % 13.1   PLT      140 - 450 K/mcL 225   NRBC      0 /100 WBC 0   DIFFERENTIAL TYPE       AUTOMATED DIFFERENTIAL   Neutrophil      % 59   LYMPH      % 27   MONO      % 9   EOSIN      % 4   BASO      % 1   Percent Immature Granuloctyes      % 0   Absolute Neutrophil      1.8 - 7.7 K/mcL 4.0   Absolute Lymph      1.0 - 4.8 K/mcL 1.9   Absolute Mono      0.3 - 0.9 K/mcL 0.6   Absolute Eos      0.1 - 0.5 K/mcL 0.3   Absolute Baso      0.0 - 0.3 K/mcL 0.1   Absolute Immature Granulocytes      0 - 0.2 K/mcl 0.0   ESR      0 - 20 mm/hr 4   Fasting Status      hrs NA   Sodium      135 - 145 mmol/L 141   Potassium      3.4 - 5.1 mmol/L 4.6   Chloride      98 - 107 mmol/L 108 (H)   CO2      21 - 32 mmol/L 30   ANION GAP      10 - 20 mmol/L 8 (L)   Glucose      65 - 99 mg/dL 90   BUN      6 - 20 mg/dL 13   Creatinine      0.67 - 1.17 mg/dL 0.78   GFR Estimate,        >90   GFR Estimate, Non        >90   BUN/CREATININE RATIO      7 - 25 17   CALCIUM      8.4 - 10.2 mg/dL 9.0   TOTAL BILIRUBIN      0.2 - 1.0 mg/dL 0.4   AST/SGOT      <38 Units/L 19   ALT/SGPT      <79 Units/L 55   ALK PHOSPHATASE      45 - 117 Units/L 84   TOTAL PROTEIN      6.4 - 8.2 g/dL 7.2   Albumin      3.6 - 5.1 g/dL 4.2   GLOBULIN      2.0 - 4.0 g/dL 3.0   A/G Ratio, Serum      1.0 - 2.4 1.4   Cyclic Citrul Pep AB       <20 Units 6   SHARITA      NEGATIVE NEGATIVE   C-REACTIVE PROTEIN      <1.0 mg/dL <0.3   Rheumatoid Factor IgG      <7 UNITS <5   Rheumatoid Factor IgM      <7 UNITS <5   Rheumatoid Factor  IgA      <7 UNITS <5       IMAGING    Accession #[id number]  EXAM: XR HAND ALVARO MIN 3V  CLINICAL INDICATION: Arthralgia, chronic pain.  COMPARISON: None.     FINDINGS:  There is a chronic appearing fracture through the waist of the right scaphoid with apparent nonunion.  There may be mild sclerosis of the distal fracture fragment without significant sclerosis of the proximal fracture fragment.  There are likely a few additional adjacent tiny fracture fragments.  Mild degree of diffuse joint space narrowing.  Well-corticated irregularity of the 3rd metacarpal head on the left could represent sequela of old injury.     IMPRESSION:     1. Mild bilateral and symmetric osteoarthritis.  2. 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  Slight sclerosis    of the distal fracture fragment.     02/18/19 7:00 pm      Dictated By: NEETA JOHNS MD  Electronically Reviewed and Approved By:  NEETA JOHNS MD  02/18/19 7:03 pm         Accession #[id number]  EXAM: XR FOOT ALVARO MIN 3V  CLINICAL INDICATION: Arthralgia, chronic paiCOMPARISON: None.     FINDINGS:  No acute fracture or dislocation is identified on either side.  The hallux sesamoids are intact.  Great toe metatarsal-phalangeal joint space narrowing and marginal spurring, worse on the left.  Elsewhere, the joint spaces are relatively well-maintained.  Bilateral plantar calcaneal spurs  are noted.  No osseous erosions or cortical irregularity are identified.     IMPRESSION:      Mild right and moderate left great toe metatarsal-phalangeal osteoarthritis.     02/18/19 6:58 pm      Dictated By:  NEETA JOHNS MD  Electronically Reviewed and Approved By:   NEETA JOHNS MD  02/18/19 7:00 pm      ASSESSMENT  Primary osteoarthritis      PLAN  I discussed the diagnostic issues at length with the patient.  He was previously sent for x-rays of his hands and feet that were reviewed with him today.  He is  felt to have primary osteoarthritis involving mainly his hands and 1st MTP joints in his feet.  His x-rays revealed osteophytes in the right 2nd and left 3rd MCP joints.  There were osteophytes in the right wrist.  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   There was marked narrowing of the left 1st MTP joint and B/L heel spurs.    There is no evidence of rheumatoid arthritis or other inflammatory arthropathy including connective tissue disease, psoriasis, spondyloarthropathy, reactive arthritis, or crystal-induced arthritis.    I recommended that he try diclofenac 1% topical gel applied t.i.d. to his painful joints   He can also try Capsaicin topical gel b.i.d.  He has already tried CBD oil p.o. but if he wants to use CBD I recommended that he use CBD topical cream.  There was no indication to inject any of his joint with steroids at this time.      CC:  Zbigniew Ellison MD      TIME:  Face to face time with patient 45 minutes with >50% of time for counseling and coordination.    Electronically Signed by: Riak Mendez MD

## 2021-05-13 PROBLEM — R41.82 ALTERED MENTAL STATUS: Status: ACTIVE | Noted: 2021-05-13

## 2021-05-13 PROBLEM — L03.116 CELLULITIS OF LEFT LEG WITHOUT FOOT: Status: ACTIVE | Noted: 2021-05-13

## 2021-09-14 PROBLEM — R06.00 DYSPNEA: Status: ACTIVE | Noted: 2021-09-14

## 2021-09-14 PROBLEM — R33.9 URINE RETENTION: Status: ACTIVE | Noted: 2021-09-14

## 2021-09-14 PROBLEM — I26.99 PULMONARY EMBOLUS (HCC): Status: ACTIVE | Noted: 2021-09-14

## 2021-09-27 PROBLEM — A41.9 SEPSIS (HCC): Status: ACTIVE | Noted: 2021-09-27

## 2021-09-27 PROBLEM — J69.0 ASPIRATION PNEUMONIA (HCC): Status: ACTIVE | Noted: 2021-09-27

## 2021-09-27 PROBLEM — N39.0 UTI (URINARY TRACT INFECTION): Status: ACTIVE | Noted: 2021-09-27

## 2021-09-27 PROBLEM — E87.5 HYPERKALEMIA: Status: ACTIVE | Noted: 2021-09-27

## 2021-09-27 PROBLEM — G93.40 ACUTE ENCEPHALOPATHY: Status: ACTIVE | Noted: 2021-09-27

## 2021-09-27 PROBLEM — N17.9 AKI (ACUTE KIDNEY INJURY) (HCC): Status: ACTIVE | Noted: 2021-09-27

## 2021-10-22 ENCOUNTER — HOME HEALTH ADMISSION (OUTPATIENT)
Dept: HOME HEALTH SERVICES | Facility: HOME HEALTH | Age: 76
End: 2021-10-22
Payer: MEDICARE

## 2021-10-25 ENCOUNTER — HOME CARE VISIT (OUTPATIENT)
Dept: HOME HEALTH SERVICES | Facility: HOME HEALTH | Age: 76
End: 2021-10-25
Payer: MEDICARE

## 2021-10-26 ENCOUNTER — HOME CARE VISIT (OUTPATIENT)
Dept: HOME HEALTH SERVICES | Facility: HOME HEALTH | Age: 76
End: 2021-10-26
Payer: MEDICARE

## 2021-10-26 ENCOUNTER — HOME CARE VISIT (OUTPATIENT)
Dept: SCHEDULING | Facility: HOME HEALTH | Age: 76
End: 2021-10-26
Payer: MEDICARE

## 2021-10-26 VITALS
SYSTOLIC BLOOD PRESSURE: 150 MMHG | HEART RATE: 93 BPM | RESPIRATION RATE: 18 BRPM | OXYGEN SATURATION: 95 % | TEMPERATURE: 98.1 F | DIASTOLIC BLOOD PRESSURE: 82 MMHG

## 2021-10-26 PROCEDURE — 3331090002 HH PPS REVENUE DEBIT

## 2021-10-26 PROCEDURE — 400013 HH SOC

## 2021-10-26 PROCEDURE — 3331090001 HH PPS REVENUE CREDIT

## 2021-10-26 PROCEDURE — 400018 HH-NO PAY CLAIM PROCEDURE

## 2021-10-26 PROCEDURE — G0493 RN CARE EA 15 MIN HH/HOSPICE: HCPCS

## 2021-10-26 PROCEDURE — G0299 HHS/HOSPICE OF RN EA 15 MIN: HCPCS

## 2021-10-26 NOTE — HOME HEALTH
Skilled services/Home bound verification:     Skilled Reason for admission/summary of clinical condition: chronic pain, DM2, HTN, COPD, parkinsons. This patient is homebound for the following reasons debilitating pain, dyspnea with exertion, impaired balance, weakness, injury or illness, limited endurance, fall risk, poor coordination, difficulty ambulating and SOB with exertion/activity, requires rest periods. Caregiver: relative. Caregiver assists with ADLS, iADLS, meals, medications, transportation, appointments. Medications reconciled and all medications are not available in the home this visit. Medications are to be delivered this afternoon. The following education was provided regarding medications, medication interactions, and look alike medications. Medications  are unable to assess effectiveness at this time. High risk medication teaching regarding anticoagulants, hyperglycemic agents or opiod narcotics performed. SN Instructed patient about the Eliquis ( apixaban ) this is helps to prevent that platelets in your blood from sticking together and forming a blood clot. Eliquis is used to lower the risk of stroke caused by a blood clot in people with a heart rhythm disorder called atrial fibrillation. Because Eliquis keeps your blood from coagulating ( clotting ) to prevent unwanted blood clots, this medicine can also make it easier for you to bleed, even from a minor injury such as a fall or a bump on the head. Do not stop taking Eliquis unless your doctor tells you to. Stopping suddenly can increase your risk of blood clot or stroke    Home Health supplies by type and quantity ordered/delivered this visit include: na    Patient education provided this visit to include: discussed fall precautions in detail- having lighted hallways, removing throw rugs, monitoring medication that may alter mental status.     Patient/caregiver degree of understanding:good    Home exercise program/Homework provided:  HEP deep breathing exercises 10x when having SOB, pain and anxiety. Pt/Caregiver instructed on plan of care and are agreeable to plan of care at this time. Physician Dr. Jasmin Mccoy notified of patient admission to home health and plan of care including anticipated frequency of nursing 2w1, 1w7 with 2 PRN and treatments/interventions/modalities of evaluation from PT/OT/ST/MSW/HHA    Discharge planning discussed with patient and caregiver. When caregiver is able to manage disease processes and medications independently or patients health condition stable. Pt/Caregiver did verbalize understanding of discharge planning. Next MD appointment 11/2/21 with Jasmin Mccoy MD. Moustapha Acosta encouraged/instructed to keep appointment as lack of follow through with physician appointment could result in discontinuation of home care services for non-compliance.

## 2021-10-27 ENCOUNTER — HOME CARE VISIT (OUTPATIENT)
Dept: HOME HEALTH SERVICES | Facility: HOME HEALTH | Age: 76
End: 2021-10-27
Payer: MEDICARE

## 2021-10-27 ENCOUNTER — HOME CARE VISIT (OUTPATIENT)
Dept: SCHEDULING | Facility: HOME HEALTH | Age: 76
End: 2021-10-27
Payer: MEDICARE

## 2021-10-27 PROCEDURE — 3331090001 HH PPS REVENUE CREDIT

## 2021-10-27 PROCEDURE — 3331090002 HH PPS REVENUE DEBIT

## 2021-10-27 NOTE — HOME HEALTH
RECENT HX OF CURRENT ILLNESS/REASON FOR REFERRAL:  The patient was referred to [facility] - INPATIENT following Face to Face encounter on 10/8/2021 by Leyla Rodriguez NP  under the supervision of Priti Barros MD.  Found in: media, Document: Progress Notes,   PDGM Dx: COPD, Chronic pain      Disciplines requested: SN PT OT ST MSW      Services to provide:  Evaluation and Treat; Medication and Disease Management        Physician to follow patient's care (the person listed here will be responsible for signing ongoing orders): Maegan Georges MD      Special Instructions: Follow up with Dr. Jillian Llanos (PCP) within 7 - 10days      Covid-19 : Neg_X__  Pos___ Not Tested____Vaccinated_____     Hospital course:    Patient was brought from rehab facility with altered mental status. Patient was recently discharged for pulmonary embolism and CO VID-19 pneumonia. Patient came with severe hypokalemia which was emergently treated with and brought under control. Patient's mentation remained altered. CT head, blood gas showed no acute pathology. Patient was given Narcan and which showed improvement with her mentation. Due to frequent doses of Narcan in the emergency room patient was placed on Narcan drip and admitted to intensive care unit. Patient's mentation improved an d weaned off of Narcan drip. Patient is on chronic morphine use for many years. Patient was started on low-dose and titrated slowly. Patient tolerated well with no evidence of withdrawals. Patient is to follow-up with pain management in regards to adjustment of her dosage. EEG negative for epileptiform activity. Patient also on Xanax as needed but did not receive during this hospital stay. Infectious disease recommended to ho ld Xanax until completion of ciprofloxacin antibiotic treatment. Patient requires IV diuresis for volume overload and symptomatically improved. Transition to oral diuretic treatment upon discharge.     Patient also found to have urinary tract infection with Enterobacter, aspiration pneumonia on chest x-ray. Patient was continued on broad coverage antibiotics. Infectious disease consulted. Patient's urine culture showed multiple drug resistant but sensitive to cefepime. Antibiotics switched to cefepime. Patient remained afebrile with normal white count. Patient is getting discharged on 5-day course of Augmentin and ciprofloxacin per ID recommendation.    PLOF/DIET/COMMUNICATION:    PAST MEDICAL HISTORY:   A-fib ([address]      Chronic pain syndrome      [facility]) [facility]      Depression      DM2 (diabetes mellitus, type 2) ([facility])      HLD (hyperlipidemia)      HTN (hypertension)      Hydronephrosis      Hypothyroidism      Insomnia      Parkinson disease (HCC)      PCOS (polycystic ovarian syndrome)      Perforated gastric ulcer (HCC)      Restless leg syndrome   CURRENT RESIDENCE/LIVING SITUATION:     EDUCATIONAL LEVEL:    ___ < OR = 8TH GRADE        ___SOME HIGH SCHOOL  ___ HIGH SCHOOL GRAD OR GED      ___POST SECONDARY EDUCATION    SUBJECTIVE (PT/FAMILY COMMENTS, THERAPIST OBSERVATIONS):     MEDICATIONS REVIEWED: PROBLEMS, NO PROBLEMS     CAREGIVER INVOLVEMENT:     ASSESSMENT OF INTERVENTION / PATIENT PROGRESS / BARRIERS / PLAN:      MD NOTIFIED OF POC AND FREQUENCY    PT GOALS:    INSTRUCTION GIVEN/EDUCATION/RESPONSE TO TEACHING:    HOME EXERCISE PROGRAM:     DISCHARGE PLANNING - (ANTICIPATED DC DATE, DC PLAN):

## 2021-10-28 ENCOUNTER — HOME CARE VISIT (OUTPATIENT)
Dept: SCHEDULING | Facility: HOME HEALTH | Age: 76
End: 2021-10-28
Payer: MEDICARE

## 2021-10-28 PROCEDURE — 3331090002 HH PPS REVENUE DEBIT

## 2021-10-28 PROCEDURE — G0152 HHCP-SERV OF OT,EA 15 MIN: HCPCS

## 2021-10-28 PROCEDURE — 3331090001 HH PPS REVENUE CREDIT

## 2021-10-28 PROCEDURE — G0153 HHCP-SVS OF S/L PATH,EA 15MN: HCPCS

## 2021-10-28 NOTE — Clinical Note
Pts cognitive communication skills are all unremarkable. Pt scored 28 out of possible 30 [facility] Exam which indicates normal cognitive and memory function. Pt is tolerating a regular a consistency diet and thin liquids without signs of aspiration. No further skilled [facility] services are indicated at this time.   ST mercado only completed

## 2021-10-28 NOTE — HOME HEALTH
Caregiver involvement: Hernandez Leach (aide) is present and assisting pt with ADL and mobility. Medications reconciled and all medications are available in the home this visit. The following education was provided regarding medications, medication interactions, and look a like medications: Continue as directed by MD.  Medications  are effective at this time. Home health supplies by type and quantity ordered/delivered this visit include: na    Patient education provided this visit:  Educated pt on need to keep her RW with her at all times during transfers. Suggested keeping 3:1 at bedside or beside couch to reduce pt's need of ambulating when home alone intermittently to reduce risk of falls. Advised pt to check suction cup grab bars in shower prior to each use for safety. Progress toward goals: Ms. Marya Starkey has good potential to return to her distant S level with self care and mobility. She currently requires mod A for safety with ADL and CGA with functional transfers. She has a personal aide and lives with her grandson who can assist with daily tasks. Home exercise program: Complete with S and use of handout to improve B UE strength and safety with ADL and functional mobility    Continued need for the following skills: Nursing, Physical Therapy, Occupational Therapy and Speech Language Pathology    The following discharge planning was discussed with the pt/caregiver: 1w1, 2w3, 1w1 with plans to discharge to self once maximal potential has been achieved with self care and functional mobility in the home setting. CONTINUED NEED FOR THE FOLLOWING SKILLS: [facility] is medically necessary to address barriers of decreased strength, decreased activity tolerance, decreased balance and pain. These barriers limit pt's participation in ADL and functional mobility safely and would benefit from continued skilled OT to maximize independende and reduce burden on caregiver.         Past Medic al History:    Diagnosis Date  A-fib (HCC)      Chronic pain syndrome      [facility]) [facility]      Depression      DM2 (diabetes mellitus, type 2) ([facility])      HLD (hyperlipidemia)      HTN (hypertension)      Hydronephrosis      Hypothyroidism      Insomnia      [facility]      PCOS (polycystic ovarian syndrome)      Perforated gastric ulcer ([facility]      Restless leg syndrome      Histo ry of presenting illness per admitting hospitalist:    Drinda Romberg is a 77y.o. year old female brought from [facility] rehab facility with altered mental status. Patient was recently discharged after being treated for COVID-19, pulmonary embolism. He was discharged on Eliquis. Patient takes morphine for chronic pain. Presentation states all of Dr. Cecelia Lott. Had hip pain. Patient was given Narcan and she improved with her ment ation. During my evaluation patient is drowsy and sleepy but mumbles words with minimal answers, withdraws to pain with no focal weakness. Hypotensive and given IV fluid boluses. Pro-Zach negative. UA positive, chest x-ray showed infiltrate. Started on empiric antibiotics. Hospital course:    Patient was brought from rehab facility with altered mental status. Patient was recently discharged for pulmonary embolism and CO VID-19 pneumonia. Patient came with severe hypokalemia which was emergently treated with and brought under control. Patient's mentation remained altered. CT head, blood gas showed no acute pathology. Patient was given Narcan and which showed improvement with her mentation. Due to frequent doses of Narcan in the emergency room patient was placed on Narcan drip and admitted to intensive care unit. Patient's mentation improved an d weaned off of Narcan drip. Patient is on chronic morphine use for many years. Patient was started on low-dose and titrated slowly. Patient tolerated well with no evidence of withdrawals.  Patient is to follow-up with pain management in regards to adjustment of her dosage. EEG negative for epileptiform activity. Patient also on Xanax as needed but did not receive during this hospital stay. Infectious disease recommended to ho ld Xanax until completion of ciprofloxacin antibiotic treatment. Patient requires IV diuresis for volume overload and symptomatically improved. Transition to oral diuretic treatment upon discharge. Patient also found to have urinary tract infection with Enterobacter, aspiration pneumonia on chest x-ray. Patient was continued on broad coverage antibiotics. Infectious disease consulted. Patient's urine culture showed multiple drug resistant but sensitive to cefepime. Antibiotics switched to cefepime. Patient remained afebrile with normal white count. Patient is getting discharged on 5-day course of Augmentin and ciprofloxacin per ID recommendation.

## 2021-10-29 ENCOUNTER — HOME CARE VISIT (OUTPATIENT)
Dept: SCHEDULING | Facility: HOME HEALTH | Age: 76
End: 2021-10-29
Payer: MEDICARE

## 2021-10-29 VITALS
OXYGEN SATURATION: 90 % | SYSTOLIC BLOOD PRESSURE: 95 MMHG | TEMPERATURE: 96.8 F | HEART RATE: 67 BPM | DIASTOLIC BLOOD PRESSURE: 52 MMHG

## 2021-10-29 PROCEDURE — G0155 HHCP-SVS OF CSW,EA 15 MIN: HCPCS

## 2021-10-29 PROCEDURE — G0151 HHCP-SERV OF PT,EA 15 MIN: HCPCS

## 2021-10-29 PROCEDURE — 3331090001 HH PPS REVENUE CREDIT

## 2021-10-29 PROCEDURE — 3331090002 HH PPS REVENUE DEBIT

## 2021-10-29 NOTE — Clinical Note
1800 grooming 2  1810 upper body dressing 2  1820 Lower body dressing  2  1830 Bathing 5  1840 Toilet Transfer 2   1845 toilet hygiene 2  [id number] [facility] Ambulation  3  1870 feeding/eating 1  1880 meals 2  [id number] 4 to 6  1400 3   8 for PT

## 2021-10-29 NOTE — HOME HEALTH
S: patient reports that she lives with her grandson and he assists with transportation  patient reports that on Wednesday night she was going to the bedside commode which was about 10 feet from the couch and then she fell - she had to call the fire department to get her up   she denies injuries from the fall. per caregiver the ST that was here yeaaterday called the doctor about the fall  she reports that she had one other fall in the nursing home   O: PAIN: L hip down to the left toes (\"like someone cruched my leg and set it on fire\")  current 9/10 (highest 9/10) (lowest 6/10)  she takes morphone 15 mg q 4 hours  last taken at 10:15 am  WOUND:no open sores or wounds reported  ROM: B hip flexion, abduction and adduction WFL   B knee flexion/extension WFL   STRENGTH: R knee ext 4+/5, R knee flexion 4+/5, R hip flex 4/5, R hip abd/adduction 4+/5  L knee flexion 3+/5 and extension 3+/5, L hip flexion 3+/5 abduction and adduction 4-/5  BED MOBILITY: patient reports that she sleeps in the recliner at night - she is expecting a hospital bed to be delivered. she feels like it is \"safer: in the recliner\"  EQUIPMENT: rollator, 3 in 1 commode, oxygen,   TRANSFERS: patient completed sit to stand with B UE push off (cues for push off instead of pulling up on the walker)  - she requires use of the walker for standing balance and safety. She was able to ambulate the bedside commode and transfers on and off with CGA and cues for hand placement and safety  GAIT: ambulating with FWW with slow dell to and from the commode (10 feet x 2) she reports that she puts the commode across the room during the day to [facility] her get up and walk\" during the day and then puts it next to the recliner ocuch at night so it is safer to use in the middle of the night. Her gait is characterized by short step length, forward flexed posture, reduced step length bilaterally.   STEPS: steps to egress the home not tested today  BALANCE: tinetti 11/28 high fall risk   A:ASSESSMENT AND PROGRESS TOWARD GOALS:  Patient demonstrated a positive result to therapy this date as evidenced by Jorge Fort improvement in foot clearance with cues, good techanieu of deep breathing with cues for sitting forward without her back restricted against the couch, no increasd pain with exercise and walking, and patient expressing an understanding of the rehab plan due to therapy verbal and written instructions. Goals established for increased independence in the home, safe mobility in the home, improvement in strength - all designed to reduce fall risk and progress toward independence. Patient will benefit from continued PT intervention to progress toward meeting all established goals    P:.continue with progression of mobility, ther ex for strength, provide education to patient and caregivers to maximize her recovery and reduce the fall risk to progress toward a return to independent function    PMH/Summary of clinical condition: hospitalized for the following per epic \"Discharge diagnosis:    Acute metabolic toxic encephalopathy, possible sepsis, medication related, improved and resolved    Aspiration pneumonia    Enterobacter urinary tract infection, present on admission    Recent LLL pulmonary embolism on Eliquis. Recent COVID-19 pneumonia    Severe hyperkalemia, emergently treated, resolved. Acute kidney injury, resolved    Physical deconditioning    Chronic pain on morphine    s/p fall. \"  PAST MEDICAL HISTORY PER EPIC \" A-fib ([address]      Chronic pain syndrome      [facility]      Depression      DM2 (diabetes mellitus, type 2) ([facility])      HLD (hyperlipidemia)      HTN (hypertension)      Hydronephrosis      Hypothyroidism      Insomnia      Parkinson disease ([address]      PCOS (polycystic ovarian syndrome)      Perforated gastric ulcer ([facility])      Restless leg syndrome\"    medications reconciled in the home   medications are effective at this time    social history/ caregivers:patient lives with her grandson in a one story house with steps to enter and exit via the front door. she also has a paid caregiver that helps her when needed with meals, ADLs, mobility, medications and transportation     Pt/Caregiver instructed on plan of care and are agreeable to plan of care at this time. Plan of care and admission to home health status called to attending Adrianne Bailey MD     Discharge planning discussed with patient and caregiver. Discharge planning as follows: DC to self and family under MD supervision when all goals are met or maximum potential is reached  Pt/Caregiver did verbalize understanding. Patient education provided this visit: safety, HEP, fall risk - patient expressed understanding     Home exercise program: stand/walk hourly with assist of her caregiver or grandson  3-5 deep breaths every hour     Continued need for the following skills: MD referral for HHPT following recent hospital stay. HHPT is medically necessary to address  dx and clinical findings decreased strength, impaired gait, decreased ability w stair negotiation, increased swelling, decreased bed mobility, decreased transfer status, decreased endurance, decreased balance and decreased safety, increased pain in order to improve functional mobility/quality of life, decrease burden of care, reduce risk for re-hospitalization, work towards patient's personal goals of return to PLOF w decrease risk for falls.

## 2021-10-30 ENCOUNTER — HOME CARE VISIT (OUTPATIENT)
Dept: SCHEDULING | Facility: HOME HEALTH | Age: 76
End: 2021-10-30
Payer: MEDICARE

## 2021-10-30 VITALS
SYSTOLIC BLOOD PRESSURE: 132 MMHG | RESPIRATION RATE: 18 BRPM | TEMPERATURE: 97.6 F | DIASTOLIC BLOOD PRESSURE: 70 MMHG | HEART RATE: 67 BPM | OXYGEN SATURATION: 93 %

## 2021-10-30 PROCEDURE — 3331090002 HH PPS REVENUE DEBIT

## 2021-10-30 PROCEDURE — 3331090001 HH PPS REVENUE CREDIT

## 2021-10-30 PROCEDURE — G0300 HHS/HOSPICE OF LPN EA 15 MIN: HCPCS

## 2021-10-30 NOTE — HOME HEALTH
Skilled reason for visit: Disease education    Caregiver involvement: Pt independent with care      Medications reviewed and all medications are available in the home this visit. Medications  are effective at this time. Home health supplies by type and quantity ordered/delivered this visit include: NA    Patient education provided this visit: Educated on how to take Synthroid before breakfast . Educated to use rollator  to prevent falls and to use bedside commod to prevent falls. Patient's Progress towards personal goals: pt verbalized understand of using rollator as well as taking synthroid before breakfast     Home exercise program: pt conitnues to take  medication as ordered and follows up on all  md appintments  Continued need for the following skills: nursing      Patient and/or caregiver notified and agrees to changes in the Plan of Care yes    The following discharge planning was discussed with the pt/caregiver: when patient reaches goals and medication is managed, and disease processes are understood patient agrees and understand that discharge will take place.

## 2021-10-31 VITALS
RESPIRATION RATE: 16 BRPM | HEART RATE: 68 BPM | TEMPERATURE: 98.9 F | DIASTOLIC BLOOD PRESSURE: 70 MMHG | OXYGEN SATURATION: 97 % | SYSTOLIC BLOOD PRESSURE: 144 MMHG

## 2021-10-31 PROCEDURE — 3331090002 HH PPS REVENUE DEBIT

## 2021-10-31 PROCEDURE — 3331090001 HH PPS REVENUE CREDIT

## 2021-10-31 NOTE — HOME HEALTH
RECENT HX OF CURRENT ILLNESS/REASON FOR REFERRAL:    The patient was referred to [address] [place] [address] following Face to Face encounter on 10/8/2021 by Chanell Jean NP  under the supervision of Thania Acosta MD.  Found in: media, Document: Progress Notes,   PDGM Dx: COPD, Chronic pain           Disciplines requested: SN PT OT ST MSW           Services to provide:  Evaluation and Treat; Medication and Disease Management                Physician to follow patient's care (the person listed here will be responsible for signing ongoing orders): Santy Moran MD           Special Instructions: Follow up with Dr. Jackie Barlow (PCP) within 7 - 10days           Covid-19 : Neg_X__  Pos___ Not Tested____Vaccinated_____          Hospital course:      Patient was brought from rehab facility with altered mental status. Patient was recently discharged for pulmonary embolism and CO VID-19 pneumonia. Patient came with severe hypokalemia which was emergently treated with and brought under control. Patient's mentation remained altered. CT head, blood gas showed no acute pathology. Patient was given Narcan and which showed improvement with her mentation. Due to frequent doses of Narcan in the emergency room patient was placed on Narcan drip and admitted to intensive care unit. Patient's mentation improved an d weaned off of Narcan drip. Patient is on chronic morphine use for many years. Patient was started on low-dose and titrated slowly. Patient tolerated well with no evidence of withdrawals. Patient is to follow-up with pain management in regards to adjustment of her dosage. EEG negative for epileptiform activity. Patient also on Xanax as needed but did not receive during this hospital stay. Infectious disease recommended to ho ld Xanax until completion of ciprofloxacin antibiotic treatment. Patient requires IV diuresis for volume overload and symptomatically improved.  Transition to oral diuretic treatment upon discharge. Patient also found to have urinary tract infection with Enterobacter, aspiration pneumonia on chest x-ray. Patient was continued on broad coverage antibiotics. Infectious disease consulted. Patient's urine culture showed multiple drug resistant but sensitive to cefepime. Antibiotics switched to cefepime. Patient remained afebrile with normal white count. Patient is getting discharged on 5-day course of Augmentin and ciprofloxacin per ID recommendation. PLOF/DIET/COMMUNICATION: I with ADLs/IADLs         PAST MEDICAL HISTORY:     A-fib ([address]        Chronic pain syndrome        [facility]        Depression        DM2 (diabetes mellitus, type 2) ([facility]        HLD (hyperlipidemia)        HTN (hypertension)        Hydronephrosis        Hypothyroidism        Insomnia        Parkinson disease ([facility]        PCOS (polycystic ovarian syndrome)        Perforated gastric ulcer ([facility])        Restless leg syndrome     CURRENT RESIDENCE/LIVING SITUATION: Grandson lives with Pt         EDUCATIONAL LEVEL:  HIGH SCHOOL GRAD        SUBJECTIVE (PT/FAMILY COMMENTS, THERAPIST OBSERVATIONS): Pt alert, verbally responsive. Private care aid present          MEDICATIONS REVIEWED: NO PROBLEMS          CAREGIVER INVOLVEMENT: has PCA daily         ASSESSMENT OF INTERVENTION / PATIENT PROGRESS / Arbie Grawn / PLAN:   Pts cognitive communication skills are all unremarkable. Pt scored 28 out of possible 30 on [facility] Mental Status Exam which indicates normal cognitive and memory function. Pt is tolerating a regular a consistency diet and thin liquids without signs of aspiration. No further skilled [facility] services are indicated at this time.   ST eval only completed         MD NOTIFIED OF POC AND FREQUENCY

## 2021-11-01 ENCOUNTER — HOME CARE VISIT (OUTPATIENT)
Dept: SCHEDULING | Facility: HOME HEALTH | Age: 76
End: 2021-11-01
Payer: MEDICARE

## 2021-11-01 VITALS — TEMPERATURE: 97.9 F | HEART RATE: 82 BPM | OXYGEN SATURATION: 96 %

## 2021-11-01 PROCEDURE — 3331090001 HH PPS REVENUE CREDIT

## 2021-11-01 PROCEDURE — G0158 HHC OT ASSISTANT EA 15: HCPCS

## 2021-11-01 PROCEDURE — 3331090002 HH PPS REVENUE DEBIT

## 2021-11-02 ENCOUNTER — HOME CARE VISIT (OUTPATIENT)
Dept: SCHEDULING | Facility: HOME HEALTH | Age: 76
End: 2021-11-02
Payer: MEDICARE

## 2021-11-02 VITALS
SYSTOLIC BLOOD PRESSURE: 128 MMHG | DIASTOLIC BLOOD PRESSURE: 84 MMHG | OXYGEN SATURATION: 95 % | HEART RATE: 64 BPM | TEMPERATURE: 98.2 F | RESPIRATION RATE: 18 BRPM

## 2021-11-02 PROCEDURE — 3331090001 HH PPS REVENUE CREDIT

## 2021-11-02 PROCEDURE — G0156 HHCP-SVS OF AIDE,EA 15 MIN: HCPCS

## 2021-11-02 PROCEDURE — 3331090002 HH PPS REVENUE DEBIT

## 2021-11-02 NOTE — HOME HEALTH
Adult Protective Services worker visited the pt prior to the end of the assessment; APS involvement was initiated following pt's hospitalization/prior to pt's Rehab release as pt has limited care support.

## 2021-11-03 PROCEDURE — 3331090001 HH PPS REVENUE CREDIT

## 2021-11-03 PROCEDURE — 3331090002 HH PPS REVENUE DEBIT

## 2021-11-03 NOTE — HOME HEALTH
SUBJECTIVE: Patient sitting upright on couch upon SIEGEL arrival.  . CAREGIVER INVOLVEMENT/ASSISTANCE NEEDED FOR: Patient has an hired aide that helps with all ADLS and IADLS. Idalmis Herbert HOME HEALTH SUPPLIES BY TYPE AND QUANTITY ORDERED/DELIVERED THIS VISIT INCLUDE: N/A  . OBJECTIVE:  See interventions. .  ASSESSMENT OF PROGRESS TOWARD GOALS: SIEGEL adjusted and Modified  AROM exercises HEP that consists of Straight arm swings, Shoulder Shrugs, Shoulder rotation backwards and forwards, Flexion, extension)  to increase BUE strength,endurance,ROM and flexion to perform adls and iadls. SIEGEL provided education and instruction that this HEP is to be performed 2-3x a day with rest breaks as needed, completing each exercise 20 times each daily. Although client was able to demo each exercise with SBA, SIEGEL provided verbal cuing for purse-lip breathing and correct body posture for increased activity tolerance while performing this exercises to prevent SOB and fatigue while engaging in BUE exercises. In return, she demo these exercises with SBA, pt in agreeance to performing this HEP regimen daily for increase functional gains. The pt able to verbalize and demo  energy conservation techniques when performing I/ADL tasks such as sitting down when performing bathing and dressing tasks. sitting down when performing meals, pacing onself when performing a functional activity to prevent exertion, allowing adequate periods of rest after a tasks, sitting down when performing LB dressing, Sitting down when performing energy conservation techniques with SBA with Min verbal cuing proivded from SIEGEL. Idalmis Herbert CONTINUED NEED FOR THE FOLLOWING SKILLS: [facility] is [facility] necessary to address barriers of decreased strength, decreased activity tolerance, decreased balance and pain.   These barriers limit pt's participation in ADL and functional mobility safely and would benefit from continued skilled OT to maximize independende and reduce burden on caregiver. Mihir Malagon PLAN FOR NEXT VISIT: christine Viry will address balance retraining and IADLS due to decreased activity tolerance when performing tasks and fear of falling. .  THE FOLLOWING DISCHARGE PLANNING WAS DISCUSSED WITH THE PATIENT/CAREGIVER: 1w1, 2w3, 1w1 with plans to discharge to self once maximal potential has been achieved with self care and functional mobility in the home setting.

## 2021-11-04 ENCOUNTER — HOME CARE VISIT (OUTPATIENT)
Dept: SCHEDULING | Facility: HOME HEALTH | Age: 76
End: 2021-11-04
Payer: MEDICARE

## 2021-11-04 VITALS
DIASTOLIC BLOOD PRESSURE: 58 MMHG | SYSTOLIC BLOOD PRESSURE: 110 MMHG | TEMPERATURE: 98.1 F | OXYGEN SATURATION: 98 % | HEART RATE: 58 BPM

## 2021-11-04 PROCEDURE — 3331090001 HH PPS REVENUE CREDIT

## 2021-11-04 PROCEDURE — 3331090002 HH PPS REVENUE DEBIT

## 2021-11-04 PROCEDURE — G0158 HHC OT ASSISTANT EA 15: HCPCS

## 2021-11-04 PROCEDURE — G0157 HHC PT ASSISTANT EA 15: HCPCS

## 2021-11-05 ENCOUNTER — HOME CARE VISIT (OUTPATIENT)
Dept: HOME HEALTH SERVICES | Facility: HOME HEALTH | Age: 76
End: 2021-11-05
Payer: MEDICARE

## 2021-11-05 ENCOUNTER — HOME CARE VISIT (OUTPATIENT)
Dept: SCHEDULING | Facility: HOME HEALTH | Age: 76
End: 2021-11-05
Payer: MEDICARE

## 2021-11-05 VITALS
OXYGEN SATURATION: 98 % | SYSTOLIC BLOOD PRESSURE: 132 MMHG | DIASTOLIC BLOOD PRESSURE: 78 MMHG | TEMPERATURE: 98.7 F | RESPIRATION RATE: 18 BRPM | HEART RATE: 84 BPM

## 2021-11-05 PROCEDURE — 3331090002 HH PPS REVENUE DEBIT

## 2021-11-05 PROCEDURE — 3331090001 HH PPS REVENUE CREDIT

## 2021-11-05 NOTE — HOME HEALTH
SUBJECTIVE: Patient sitting upright on couch upon SIEGEL arrival.    . CAREGIVER INVOLVEMENT/ASSISTANCE NEEDED FOR: Patient has an hired aide that helps with all ADLS and IADLS. [facility] BY TYPE AND QUANTITY ORDERED/DELIVERED THIS VISIT INCLUDE: N/A    . OBJECTIVE:  See interventions. .    ASSESSMENT OF PROGRESS TOWARD GOALS: Patient is making progressing with goals on OT POC. Client able to engage in functional dynamic standing task (Washing dishes) at sink level with use of FWW with CGA  for an duration of 3 min's and 15 sec's with poor+/Fair balance  to promote independence with IADL. SIEGEL provided verbal instructions for client to reach ipsilaterally, while crossing midline to increased activity tolerance while reaching out of DEWAYNE to perform Adls/IAdls. Client was able to achieve this tasks with with no periods of LOB. Patient performed functional transfers to the toilet, shower, bed and couch with MIN A  at the [email or web address] provided verbal instrucions for proprer foot placement on floor for proper trunk control and body alignment when reaching out of DEWAYNE for improved equaliqbrium when performing to tasks to prevent falls. Pt did require an rest break for an duration of 5 mins in between activity due to decreased activity tolerance,SOB, and fatigue. .         CONTINUED NEED FOR THE FOLLOWING SKILLS[facility] to address barriers of decreased strength, decreased activity tolerance, decreased balance and pain. These barriers limit pt's participation in ADL and functional mobility safely and would benefit from continued skilled OT to maximize independende and reduce burden on caregiver. [facility] PLAN FOR NEXT VISIT: [facility] will address balance retraining and IADLS due to decreased activity tolerance when performing tasks and fear of falling.      .    THE FOLLOWING DISCHARGE PLANNING WAS DISCUSSED WITH THE PATIENT/CAREGIVER: 1w1, 2w3, 1w1 with plans to discharge to self once maximal potential has been achieved with self care and functional mobility in the home setting.

## 2021-11-06 ENCOUNTER — HOME CARE VISIT (OUTPATIENT)
Dept: HOME HEALTH SERVICES | Facility: HOME HEALTH | Age: 76
End: 2021-11-06
Payer: MEDICARE

## 2021-11-06 PROCEDURE — 3331090002 HH PPS REVENUE DEBIT

## 2021-11-06 PROCEDURE — 3331090001 HH PPS REVENUE CREDIT

## 2021-11-06 NOTE — HOME HEALTH
called pt to schedule visit. pt refused visit stating she is not feeling well and was up all night bc her oxygen tubing had a hole in it. Pt family member put new tubing on and she was able to get oxygen. pt stated she just wanted to sleep, and reschedule visit. pt will be missed visit today.

## 2021-11-07 PROCEDURE — 3331090001 HH PPS REVENUE CREDIT

## 2021-11-07 PROCEDURE — 3331090002 HH PPS REVENUE DEBIT

## 2021-11-08 ENCOUNTER — HOME CARE VISIT (OUTPATIENT)
Dept: HOME HEALTH SERVICES | Facility: HOME HEALTH | Age: 76
End: 2021-11-08
Payer: MEDICARE

## 2021-11-08 PROCEDURE — 3331090001 HH PPS REVENUE CREDIT

## 2021-11-08 PROCEDURE — 3331090002 HH PPS REVENUE DEBIT

## 2021-11-09 ENCOUNTER — HOME CARE VISIT (OUTPATIENT)
Dept: SCHEDULING | Facility: HOME HEALTH | Age: 76
End: 2021-11-09
Payer: MEDICARE

## 2021-11-09 VITALS
OXYGEN SATURATION: 93 % | SYSTOLIC BLOOD PRESSURE: 138 MMHG | TEMPERATURE: 97.9 F | HEART RATE: 66 BPM | DIASTOLIC BLOOD PRESSURE: 62 MMHG

## 2021-11-09 PROCEDURE — 3331090001 HH PPS REVENUE CREDIT

## 2021-11-09 PROCEDURE — G0157 HHC PT ASSISTANT EA 15: HCPCS

## 2021-11-09 PROCEDURE — 3331090002 HH PPS REVENUE DEBIT

## 2021-11-09 NOTE — HOME HEALTH
Subjective: I am still worn out from my trip to the doctor yesterday    Caregiver involvement: Pt lives with grandson, but he is in and out of the house with work. Pt also has CG that comes over as needed. Medications reconciled and all medications are available in the home this visit. Medications are effective at this time. no new medication. Patient education provided this visit: skin checks, pressure relief    Progress toward goals: Pt reports no falls. Pt wears shoes when up and moving. Pt states she checks feet every 2-3 days, Discussed performing skin checks daily. Pt increasing distance she is amb and from 20 feet to now 68 feet with 4 wheeled walker w/o O2. VC for upright posture and for increasing and equalizing step length. Pt also requires vc for breathing while amb. Pt's O2 sats 97% and HR 82 bpm upon sitting. Discussed with pt amb every hour during the day during waking hours. Pt has improved with transfers from [facility] to now close supervision. Plan to continue working on standing balance and endurance to increase safety with community level mobility. Discussed attempting stairs next session. Continued need for the following skills: Continue HHPT to increase safety and independence with household and community level mobility. The following discharge planning was discussed with the pt/caregiver: d/c HHPT in 4 more visits.

## 2021-11-10 ENCOUNTER — HOME CARE VISIT (OUTPATIENT)
Dept: SCHEDULING | Facility: HOME HEALTH | Age: 76
End: 2021-11-10
Payer: MEDICARE

## 2021-11-10 VITALS
HEART RATE: 78 BPM | SYSTOLIC BLOOD PRESSURE: 128 MMHG | TEMPERATURE: 98 F | OXYGEN SATURATION: 96 % | DIASTOLIC BLOOD PRESSURE: 78 MMHG | RESPIRATION RATE: 16 BRPM

## 2021-11-10 PROCEDURE — 3331090001 HH PPS REVENUE CREDIT

## 2021-11-10 PROCEDURE — G0158 HHC OT ASSISTANT EA 15: HCPCS

## 2021-11-10 PROCEDURE — G0299 HHS/HOSPICE OF RN EA 15 MIN: HCPCS

## 2021-11-10 PROCEDURE — 3331090002 HH PPS REVENUE DEBIT

## 2021-11-10 NOTE — HOME HEALTH
Skilled reason for visit: medication and disease mgmt, vs, pain, md appts     Caregiver involvement: patients cg is private aide and is available as needed or assistance with IADL's, ADL's, meal prep, medication management, and taking patient to all doctors appointments. Medications reviewed and all medications are available in the home this visit. The following education was provided regarding medications, medication interactions, and look alike medications (specify): morphine, eliquis, metformin   Medications  are effective at this time. Home health supplies by type and quantity ordered/delivered this visit include: n/a    Patient education provided this visit: pt educated on s/s of CHF exacerbation: sob when lying down or exertion, fatigue and weakness, swelling/ edema in your legs, ankles and feet, rapid or irregular heart beat, reduced ability to exercise, and persistent cough or wheezing with white/pink or blood-tinged phlegm. Notify Md if any occur. Instruct patient/caregiver in congestive heart failure disease process. Instruct patient/caregiver in what the specific cause of the heart failure is (CAD, untreated high blood pressure, faulty heart valves, cardiomyopathy, under/over active thyroid, diabetes or kidney disease.)  Instruct pt. to recognize symptoms of CHF exacerbation: edema, orthopnea, weight gain, and dyspnea. Teach patient to elevate extremities above the level of the heart, to avoid crossing of the legs, and on the proper application of [place] hose. Instruct on diuretic therapy. patient to follow eliquis regimen and to monitor for s/s of [EXCESSIVE BLEDDING, BRUSING, BLEEDING GUMS, BLACK TARY STOOLS, BLOODY STOOLS] and report to MD. patient encouraged to monitor for increase in pain and to continue with current pain management and to notify staff/md if pain becomes excrutiating/intolerable.   patient instructed to perform sob hep 4-5 x daily and prn for sob, to promote lung expansion. Instruct patient/caregiver in methods to conserve energy. TAKE FREQUENT BREAKS, USE ASSISTIVE DEVICE. Pt to follow all covid-19 precautions: Stay home for the duration of the time recommended by healthcare provider, except to get medical care. Separate yourself from others with 6 feet precautions. Wear a face mask around others when sharing a room or vehicle. Avoid sharing personal items with others. Clean and wash hands often for 20 seconds or more. Always cover sneezes with kleenex and throw away after use in lined container. Avoid touching mouth, eyes, and nose with unwashed hands. Progress toward goals: KEEP O2 SATS ABOVE 90%, NO SOB, NO S/S OF INFECTION, AND ELEVATE LOWER EXTREMITIES TO DECREASE EDEMA       Home exercise program: continue home exercises program as developed by physical therapy           Continued need for the following skills: Nursing, Physical Therapy and Occupational Therapy    Patient and/or caregiver notified and agrees to changes in the Plan of Care YES      The following discharge planning was discussed with the pt/caregiver: Patient will be discharged once education has completed, patient is medically stable and pt/cg are able to independently manage medications and disease process.

## 2021-11-11 ENCOUNTER — HOME CARE VISIT (OUTPATIENT)
Dept: SCHEDULING | Facility: HOME HEALTH | Age: 76
End: 2021-11-11
Payer: MEDICARE

## 2021-11-11 VITALS
SYSTOLIC BLOOD PRESSURE: 132 MMHG | RESPIRATION RATE: 18 BRPM | OXYGEN SATURATION: 98 % | TEMPERATURE: 98 F | DIASTOLIC BLOOD PRESSURE: 78 MMHG | HEART RATE: 84 BPM

## 2021-11-11 VITALS
OXYGEN SATURATION: 99 % | RESPIRATION RATE: 18 BRPM | DIASTOLIC BLOOD PRESSURE: 68 MMHG | HEART RATE: 54 BPM | TEMPERATURE: 97.5 F | SYSTOLIC BLOOD PRESSURE: 132 MMHG

## 2021-11-11 PROCEDURE — G0157 HHC PT ASSISTANT EA 15: HCPCS

## 2021-11-11 PROCEDURE — 3331090001 HH PPS REVENUE CREDIT

## 2021-11-11 PROCEDURE — 3331090002 HH PPS REVENUE DEBIT

## 2021-11-11 NOTE — HOME HEALTH
SUBJECTIVE: Patient stated she was not feeling to well on this date. Mara Choe CAREGIVER INVOLVEMENT/ASSISTANCE NEEDED FOR: Patient has an hired aide that helps with all ADLS and IADLS. [employer] HOME HEALTH SUPPLIES BY TYPE AND QUANTITY ORDERED/DELIVERED THIS VISIT INCLUDE: N/A         . OBJECTIVE:  See interventions. .         ASSESSMENT OF PROGRESS TOWARD GOALS: Patient is making slow process on OT [address]. Patient is very self-limiting when performing OT HH treatments. She is making slow progression in functional transfers. Pt able to perform and initiate sit <> stand transfers with Min A with FWW  with proper use of  proper body mechanics to include proper hand placement. Pt declined functional transfers to the toilet, shower, bed and couch on this date. She stated she was tired and could not participate in therapy . She continues to perform all BUE exercises with SBA with max verbal cuing provided for proper body aligment and trunk control. .                   CONTINUED NEED FOR THE FOLLOWING SKILLS: [facility] OT is medically necessary to address barriers of decreased strength, decreased activity tolerance, decreased balance and pain. These barriers limit pt's participation in ADL and functional mobility safely and would benefit from continued skilled OT to maximize independende and reduce burden on caregiver. [facility] [employer] PLAN FOR NEXT VISIT: Britt Pitt will address balance retraining and IADLS due to decreased activity tolerance when performing tasks and fear of falling. .         THE FOLLOWING DISCHARGE PLANNING WAS DISCUSSED WITH THE PATIENT/CAREGIVER: 1w1, 2w3, 1w1 with plans to discharge to self once maximal potential has been achieved with self care and functional mobility in the home setting.

## 2021-11-11 NOTE — HOME HEALTH
Subjective: My legs are killing me, they woke me up at 4am.    Caregiver involvement: Pt lives with grandson who is in and out of the house with work. She has a CG that intermittently assists as needed. Medications reconciled and all medications are available in the home this visit. Medications are effective at this time. no new medication. Patient education provided this visit: safety with O2. Progress toward goals: Pt reports no falls. Pt has progressed with transfers from [address] to now sit <->stand from recliner couch SBA. She uses B UE support and wide DEWAYNE requiring increased time and effort for transition. Pt has progressed with amb from amb 20 feet with CGA to now amb 75 feet with 4 wheeled walker and close supervision. Pt amb with slow dell and step through gait pattern, inconsistent with passing opposite foot. O2 sats 92% on RA and HR 81 bpm after amb. Pt sat on couch and place 2L O2 NC back on stating she felt SOB, respirations were 26 breathes per minute, but returned back to 18 breathes per minute in less than 1 minute. Pt requires max encouragement for activity due to c/o pain in L LE and fatigue with all activity. Discussed with pt, plan to attempt stairs next visit. Continued need for the following skills: Continue HHPT to increase safety and tolerance for household mobility and decreasing risk of falls. The following discharge planning was discussed with the pt/caregiver: d/c HHPT in 3 more visits.

## 2021-11-12 PROCEDURE — 3331090001 HH PPS REVENUE CREDIT

## 2021-11-12 PROCEDURE — 3331090002 HH PPS REVENUE DEBIT

## 2021-11-13 PROCEDURE — 3331090002 HH PPS REVENUE DEBIT

## 2021-11-13 PROCEDURE — 3331090001 HH PPS REVENUE CREDIT

## 2021-11-14 PROCEDURE — 3331090001 HH PPS REVENUE CREDIT

## 2021-11-14 PROCEDURE — 3331090002 HH PPS REVENUE DEBIT

## 2021-11-15 ENCOUNTER — HOME CARE VISIT (OUTPATIENT)
Dept: SCHEDULING | Facility: HOME HEALTH | Age: 76
End: 2021-11-15
Payer: MEDICARE

## 2021-11-15 ENCOUNTER — HOME CARE VISIT (OUTPATIENT)
Dept: HOME HEALTH SERVICES | Facility: HOME HEALTH | Age: 76
End: 2021-11-15
Payer: MEDICARE

## 2021-11-15 VITALS
OXYGEN SATURATION: 97 % | SYSTOLIC BLOOD PRESSURE: 134 MMHG | DIASTOLIC BLOOD PRESSURE: 64 MMHG | HEART RATE: 77 BPM | TEMPERATURE: 99.3 F

## 2021-11-15 PROCEDURE — G0157 HHC PT ASSISTANT EA 15: HCPCS

## 2021-11-15 PROCEDURE — 3331090001 HH PPS REVENUE CREDIT

## 2021-11-15 PROCEDURE — 3331090002 HH PPS REVENUE DEBIT

## 2021-11-15 PROCEDURE — G0158 HHC OT ASSISTANT EA 15: HCPCS

## 2021-11-15 NOTE — Clinical Note
I saw her 3 weeks go and am honestly not sure at this time. If you like I can try and see her today to assess the situation. Please email/call me if you would like me to see her. Thank you,   Guillermina  ----- Message -----  From: Shalom Bowen RN  Sent: 11/15/2021   9:08 PM EST  To: Drew Iverson RN      patient has home health aide ordered and it looks like mostly missed visits at this time. Can one of you confirm with the patient the need for the HHA and if there are any instructions the aides need to be aware of to complete the aide visit.

## 2021-11-16 ENCOUNTER — HOME CARE VISIT (OUTPATIENT)
Dept: SCHEDULING | Facility: HOME HEALTH | Age: 76
End: 2021-11-16
Payer: MEDICARE

## 2021-11-16 PROCEDURE — G0156 HHCP-SVS OF AIDE,EA 15 MIN: HCPCS

## 2021-11-16 PROCEDURE — 3331090002 HH PPS REVENUE DEBIT

## 2021-11-16 PROCEDURE — 3331090001 HH PPS REVENUE CREDIT

## 2021-11-16 NOTE — HOME HEALTH
SUBJECTIVE: Patient stated she was not feeling to well on this date. Patient reported an pain an 8/10 in her BLE. Karmen Irwin CAREGIVER INVOLVEMENT/ASSISTANCE NEEDED FOR: Patient has an hired aide that helps with all ADLS and IADLS. Karmen Irwin HOME HEALTH SUPPLIES BY TYPE AND QUANTITY ORDERED/DELIVERED THIS VISIT INCLUDE: N/A                   . OBJECTIVE:  See interventions. .                   ASSESSMENT OF PROGRESS TOWARD GOALS: Patient is making slow process on OT [address]. Patient is very self-limiting when performing OT HH treatments. She is making slow progression in functional transfers. Pt able to perform and initiate sit <> stand transfers with Min A with FWW  with proper use of  proper body mechanics to include proper hand placement. Pt declined functional transfers to the toilet, shower, bed and couch on this date. She stated she was tired and could not participate in therapy . She continues to perform all BUE exercises with SBA with max verbal cuing provided for proper body aligment and trunk control. Pain this visit was 8/10. SIEGEL contacted MD office and instrusted client on to make medication accordly to be in complaiance. .                                       CONTINUED NEED FOR THE FOLLOWING SKILLS: [facility] OT is medically necessary to address barriers of decreased strength, decreased activity tolerance, decreased balance and pain. These barriers limit pt's participation in ADL and functional mobility safely and would benefit from continued skilled OT to maximize independende and reduce burden on caregiver. Karmen Irwin PLAN FOR NEXT VISIT: Reed Lima will address balance retraining and IADLS due to decreased activity tolerance when performing tasks and fear of falling.                     .                   THE FOLLOWING DISCHARGE PLANNING WAS DISCUSSED WITH THE PATIENT/CAREGIVER: 1w1, 2w3, 1w1 with plans to discharge to self once maximal potential has been achieved with self care and functional mobility in the home setting.

## 2021-11-16 NOTE — CASE COMMUNICATION
patient has home health aide ordered and it looks like mostly missed visits at this time. Can one of you confirm with the patient the need for the HHA and if there are any instructions the aides need to be aware of to complete the aide visit.

## 2021-11-17 ENCOUNTER — HOME CARE VISIT (OUTPATIENT)
Dept: SCHEDULING | Facility: HOME HEALTH | Age: 76
End: 2021-11-17
Payer: MEDICARE

## 2021-11-17 PROCEDURE — 3331090001 HH PPS REVENUE CREDIT

## 2021-11-17 PROCEDURE — 3331090002 HH PPS REVENUE DEBIT

## 2021-11-17 PROCEDURE — G0157 HHC PT ASSISTANT EA 15: HCPCS

## 2021-11-18 ENCOUNTER — HOME CARE VISIT (OUTPATIENT)
Dept: SCHEDULING | Facility: HOME HEALTH | Age: 76
End: 2021-11-18
Payer: MEDICARE

## 2021-11-18 VITALS
OXYGEN SATURATION: 92 % | TEMPERATURE: 97.7 F | SYSTOLIC BLOOD PRESSURE: 172 MMHG | HEART RATE: 89 BPM | DIASTOLIC BLOOD PRESSURE: 82 MMHG | RESPIRATION RATE: 19 BRPM

## 2021-11-18 VITALS
DIASTOLIC BLOOD PRESSURE: 62 MMHG | SYSTOLIC BLOOD PRESSURE: 124 MMHG | TEMPERATURE: 98 F | OXYGEN SATURATION: 95 % | HEART RATE: 59 BPM

## 2021-11-18 VITALS
SYSTOLIC BLOOD PRESSURE: 132 MMHG | DIASTOLIC BLOOD PRESSURE: 84 MMHG | HEART RATE: 86 BPM | TEMPERATURE: 98.7 F | RESPIRATION RATE: 17 BRPM | OXYGEN SATURATION: 98 %

## 2021-11-18 PROCEDURE — 3331090002 HH PPS REVENUE DEBIT

## 2021-11-18 PROCEDURE — G0300 HHS/HOSPICE OF LPN EA 15 MIN: HCPCS

## 2021-11-18 PROCEDURE — 3331090001 HH PPS REVENUE CREDIT

## 2021-11-18 NOTE — HOME HEALTH
Skilled reason for visit: COPD, Atrial Fibrillation, Diabetes, Depression, Skin Impairment Prevention and Safety Precautions Education    Caregiver involvement: Patient is independent at this time. Family/friends are available should a need arise. Medications reviewed and all medications ARE available in the home this visit. The following education was provided regarding medications, medication interactions, and look alike medications (specify): Losartan-Patient/Caregiver was educated on this medication and the purpose of it. Patient/Caregiver verbalized understanding. Medications  are INEFFECTIVE at this time. Patient has not taken this medication today as of yet. Home health supplies by type and quantity ordered/delivered this visit include: N/A    Patient education provided this visit: were educated on pressure relief regarding sacral area. Having patient change position every couple of hours to take pressure off of specific areas in order to prevent skin breakdown or prevent further skin impairment. Patient was educated on monitoring blood pressure prior to taking blood pressure medication, and be sure to keep a log of blood pressures and blood sugar readings. Progress toward goals: Patient is making progress towards goals as she is now being treated for UTI, denies any falls, area on ear has healed, and no new areas noted. Home exercise program: Patient will work towards staying ahead of her pain by taking her pain medication regularly in order to help better manage her pain which will prevent the rise in her blood pressure related to being in pain. Continued need for the following skills: Nursing will continue to follow patient until wound is healed and education is understood and able to be verbalized by patient/caregiver.     Patient and/or caregiver notified and agrees to changes in the Plan of Care NA      The following discharge planning was discussed with the pt/caregiver: when patient reaches her goals and medication is managed, and disease processes are understood patient agrees and understand that discharge will take place.

## 2021-11-18 NOTE — HOME HEALTH
Subjective: This weather is making my legs hurt so bad. I took my pain medicine about 20 minutes ago. Caregiver involvement: Pt lives with grandson, but he comes and goes t/o the day for work. Pt also has paid CG that comes out as needed. Medications reconciled and all medications are available in the home this visit. Medications are effective at this time. no new medication. Patient education provided this visit: increase frequency of amb in home. discussed maintaining clean and dry feet with non skid footwear. Progress toward goals: Pt is still sleeping in the recliner, but discussed alternating between reclining on couch and laying on couch for pressure relief. supine <-> sit mod I from couch. Pt has progressed with transfers from [address] to now sit <-> stand from couch and commode and toilet mod I. Pt uses B UE support and requires increased time. Pt progressed with amb 10 feet x 2 with FWW and now is amb 112 feet with 4 wheeled walker and mod I. She amb with decreased step through gait pattern, but does not pass opposite foot. No LOB amb over changes in surfaces in home. She has declined performing stairs each visit due to increased c/o pain in B LE, but has left home 2 x last week to go to doctor appointments. Pt has no open sores and has had no falls since [facility]. Her standing balance has improved as evidenced by Tinetti score increasing from 11/28 to now 15/28. Her strength has improved as evidenced by pt now able to perform 5 sit <-> stand with B UE support. Pt is on track for d/c next visit. Continued need for the following skills: Continue HHPT to increase safety with amb, decreasing risk of falls. The following discharge planning was discussed with the pt/caregiver: d/c HHPT next visit.

## 2021-11-19 ENCOUNTER — HOME CARE VISIT (OUTPATIENT)
Dept: SCHEDULING | Facility: HOME HEALTH | Age: 76
End: 2021-11-19
Payer: MEDICARE

## 2021-11-19 PROCEDURE — 3331090002 HH PPS REVENUE DEBIT

## 2021-11-19 PROCEDURE — 3331090001 HH PPS REVENUE CREDIT

## 2021-11-19 PROCEDURE — G0158 HHC OT ASSISTANT EA 15: HCPCS

## 2021-11-20 VITALS
DIASTOLIC BLOOD PRESSURE: 84 MMHG | HEART RATE: 78 BPM | SYSTOLIC BLOOD PRESSURE: 132 MMHG | TEMPERATURE: 98 F | OXYGEN SATURATION: 98 % | RESPIRATION RATE: 18 BRPM

## 2021-11-20 PROCEDURE — 3331090001 HH PPS REVENUE CREDIT

## 2021-11-20 PROCEDURE — 3331090002 HH PPS REVENUE DEBIT

## 2021-11-20 NOTE — HOME HEALTH
SUBJECTIVE: Patient stated she was not feeling to well on this date. Patient reported an pain an 8/10 in her BLE. [facility] CAREGIVER INVOLVEMENT/ASSISTANCE NEEDED FOR: Patient has an hired aide that helps with all ADLS and IADLS. [facility] HOME HEALTH SUPPLIES BY TYPE AND QUANTITY ORDERED/DELIVERED THIS VISIT INCLUDE: N/A                                       . OBJECTIVE:  See interventions. .                                       ASSESSMENT OF PROGRESS TOWARD GOALS: Patient is making slow process on OT [address]. Patient is very self-limiting when performing OT HH treatments. Client able to employ Toma Bench for  BUE exercises such as (Straight arm swings,Upright Roes, Front Curls,Side Lifts, Straight Arm Lateral raises, Diagonal Front Curl, Horizontal Flex,Triceps Extension,Shoulder Shrug) using an 2# weight to increase BUE strength,endurance,ROM and flexion to perform adls and iadls. This HEP is to be performed 2-3x a day with rest breaks as needed. Client able to demo - Therapist educated client on purse lip breathing exercises( Smell the roses and blow out the birthday candles)  and deep effective breathing( Place two hands on abdomen and focus on left hand and belly moving out on inhale and IN on exhale) to utilize the lower lobes of lungs for  oxygen to reach all parts of the body  to prevent exertion, SOB, and  fatigue when performing adls and functional transfers, In return client demo 70% of purse lip breathing exercises, while therapist provided S of  for activity success and completion.                  .                                                                               CONTINUED NEED FOR THE FOLLOWING SKILLS[facility] is medically necessary to address barriers of decreased strength, decreased activity tolerance, decreased balance and pain. These barriers limit pt's participation in ADL and functional mobility safely and would benefit from continued skilled OT to maximize independende and reduce burden on caregiver. Yu Choe PLAN FOR NEXT VISIT: OT DISCHARGE                                        . THE FOLLOWING DISCHARGE PLANNING WAS DISCUSSED WITH THE PATIENT/CAREGIVER: 1w1, 2w3, 1w1 with plans to discharge to self once maximal potential has been achieved with self care and functional mobility in the home setting.

## 2021-11-21 PROCEDURE — 3331090002 HH PPS REVENUE DEBIT

## 2021-11-21 PROCEDURE — 3331090001 HH PPS REVENUE CREDIT

## 2021-11-22 ENCOUNTER — HOME CARE VISIT (OUTPATIENT)
Dept: SCHEDULING | Facility: HOME HEALTH | Age: 76
End: 2021-11-22
Payer: MEDICARE

## 2021-11-22 PROCEDURE — G0152 HHCP-SERV OF OT,EA 15 MIN: HCPCS

## 2021-11-22 PROCEDURE — 3331090001 HH PPS REVENUE CREDIT

## 2021-11-22 PROCEDURE — 3331090002 HH PPS REVENUE DEBIT

## 2021-11-22 NOTE — Clinical Note
Mrs. Dunia Sanches was seen by skilled OT for 7 visits s/p recovery from diagnosis of Encounter for Chronic pain syndrome . Skilled OT goals were to maximize her safety and participation with self care, balance retraining and functional mobility in her home setting. Patient has  been educated on energy conservation strategies to reduce SOB and level of exertion with I/ADL tasks. Education has also been provided to the pt for Purse-lip breathing techniques to prevent exertion when performing daily tasks I/ADLS. Patient has met goal on OT POC addressing functional mobility and transfers. Pt performed functional transfers to the toilet, shower, bed and couch with SUP at the RW level. Pt also able to initiate sit <> stand transfers with SBA and use of proper body mechanics to include proper hand placement. Ms. Dunia Sanches has met goal on OT POC addressing BUE strength and endurance. Pt is compliant with HEP regimen, she performs her HEP daily with MOD I. Pt BUE is an 4/5 on MMT performed by SIEGEL. This is advancement as on evaluation the LUE 4-/5 and the RUE was an 4-/5. Pt did not met goal on OT POC. She was not in agreeance to performing bathing and dressing tasks with SIEGEL. However pt was able to verblize ETC such as energy conservation techniques when performing I/ADL tasks such as sitting down when performing bathing and dressing tasks. sitting down when performing meals, pacing onself when performing a functional activity to prevent exertion, allowing adequate periods of rest after a tasks when performing energy conservation techniques with IND. Pt has met this goal on OT POC addressing balance retraining tasks. Pt able to demo Static/dynamic standing balance with fair +. Patient able to stand and reach across midline minimally, Pt also able to able to stand unsupported against minimal resistance while performing Meal prep tasks and cleaning countertop. Pt has met goal on OT POC addressing energy conservation techniques.  Pt on Modified Sachin Scale currently has an  RPE score of 7/10. This is an advancement due to the patient RPE score was an 9/10 on eval. Therapist suggests continued use of HEP for continued BUE strength and endurance. Pt has reached maximal potential wish skilled OT at this time. No further skilled OT is indicated at this time. MD office notified.  Thank you for your Referral!!!

## 2021-11-22 NOTE — Clinical Note
Thank you  ----- Message -----  From: Noheliakeith Goyal  Sent: 11/24/2021   9:49 PM EST  To: Holly Oneill, OT      Mrs. Contreras Ruiz was seen by skilled OT for 7 visits s/p recovery from diagnosis of Encounter for Chronic pain syndrome . Skilled OT goals were to maximize her safety and participation with self care, balance retraining and functional mobility in her home setting. Patient has  been educated on energy conservation strategies to reduce SOB and level of exertion with I/ADL tasks. Education has also been provided to the pt for Purse-lip breathing techniques to prevent exertion when performing daily tasks I/ADLS. Patient has met goal on OT POC addressing functional mobility and transfers. Pt performed functional transfers to the toilet, shower, bed and couch with SUP at the RW level. Pt also able to initiate sit <> stand transfers with SBA and use of proper body mechanics to include proper hand placement. Ms. Contreras Ruiz has met goal on OT POC addressing BUE strength and endurance. Pt is compliant with HEP regimen, she performs her HEP daily with MOD I. Pt BUE is an 4/5 on MMT performed by SIEGEL. This is advancement as on evaluation the LUE 4-/5 and the RUE was an 4-/5. Pt did not met goal on OT POC. She was not in agreeance to performing bathing and dressing tasks with SIEGEL. However pt was able to verblize ETC such as energy conservation techniques when performing I/ADL tasks such as sitting down when performing bathing and dressing tasks. sitting down when performing meals, pacing onself when performing a functional activity to prevent exertion, allowing adequate periods of rest after a tasks when performing energy conservation techniques with IND. Pt has met this goal on OT POC addressing balance retraining tasks. Pt able to demo Static/dynamic standing balance with fair +.  Patient able to stand and reach across midline minimally, Pt also able to able to stand unsupported against minimal resistance while performing Meal prep tasks and cleaning countertop. Pt has met goal on OT POC addressing energy conservation techniques. Pt on Modified Sachin Scale currently has an  RPE score of 7/10. This is an advancement due to the patient RPE score was an 9/10 on eval. Therapist suggests continued use of HEP for continued BUE strength and endurance. Pt has reached maximal potential wish skilled OT at this time. No further skilled OT is indicated at this time. MD office notified.  Thank you for your Referral!!!

## 2021-11-23 ENCOUNTER — HOME CARE VISIT (OUTPATIENT)
Dept: SCHEDULING | Facility: HOME HEALTH | Age: 76
End: 2021-11-23
Payer: MEDICARE

## 2021-11-23 PROCEDURE — 3331090002 HH PPS REVENUE DEBIT

## 2021-11-23 PROCEDURE — 3331090001 HH PPS REVENUE CREDIT

## 2021-11-23 PROCEDURE — G0156 HHCP-SVS OF AIDE,EA 15 MIN: HCPCS

## 2021-11-24 ENCOUNTER — HOME CARE VISIT (OUTPATIENT)
Dept: HOME HEALTH SERVICES | Facility: HOME HEALTH | Age: 76
End: 2021-11-24
Payer: MEDICARE

## 2021-11-24 ENCOUNTER — HOME CARE VISIT (OUTPATIENT)
Dept: SCHEDULING | Facility: HOME HEALTH | Age: 76
End: 2021-11-24
Payer: MEDICARE

## 2021-11-24 PROCEDURE — 3331090001 HH PPS REVENUE CREDIT

## 2021-11-24 PROCEDURE — 3331090002 HH PPS REVENUE DEBIT

## 2021-11-25 ENCOUNTER — HOME CARE VISIT (OUTPATIENT)
Dept: SCHEDULING | Facility: HOME HEALTH | Age: 76
End: 2021-11-25
Payer: MEDICARE

## 2021-11-25 PROCEDURE — 3331090001 HH PPS REVENUE CREDIT

## 2021-11-25 PROCEDURE — 3331090002 HH PPS REVENUE DEBIT

## 2021-11-25 PROCEDURE — 400018 HH-NO PAY CLAIM PROCEDURE

## 2021-11-25 NOTE — HOME HEALTH
Caregiver involvement:  Mrs. Stacy Jaffe currently in an [place] home with her grandson. The patient has an care-giver and grandson that provides daily emotional support and assistance with self care and mobility. Medications reconciled and all medications are available in the home this visit. The following education was provided regarding medications, medication interactions, and look a like medications: Continue as directed by MD.  Medications  are effective at this time. Home health supplies by type and quantity ordered/delivered this visit include: n/a    Patient education provided this visit:  Educated on importance of performing BUE exercise daily for continued strength and endurance. Educated client on the of continue use of energy conservation and purse-lip breathing to prevent exertion when engaging in daily tasks living . Progress toward goals: Patient has met goal on OT POC addressing functional mobilty and transfers. Pt performed functional transfers to the toilet, shower, bed and couch with SUP at the RW level. Pt also able to initiate sit <> stand transfers with SBA and use of proper body mechanics to include proper hand placement. Cindy Lucio has met goal on OT POC addressing BUE strength and endurance. Pt is compliant with HEP regiem, she perofrms her HEP daily with MOD I. Pt BUE is an 4/5 on MMT performed by SIEGEL. This is advancement as on evaluation the LUE 4-/5 and the RUE was an 4-/5. Pt did not met goal on OT POC. She was not in agreeance to performing bathing and dresssing tasks with SIEGEL. However pt was able to verblize ETC such as energy conservation techniques when performing I/ADL tasks such as sitting down when performing bathing and dressing tasks. sitting down when performing meals, pacing onself when performing a functional activity to prevent exertion, allowing adequate periods of rest after a tasks when performing energy conservation techniques with IND.  Pt has met this goal on OT POC addressing balance retraining tasks. Pt able to demo Static/dynamic standing balance with fair +. Patient able to stand and reach across midline minimally, Pt also able to able to stand unsupported against minimal resistance while perofrming Meal prep tasks and cleaning countertop. Pt has met goal on OT POC addressing energy conservation techniques. Pt on Modified Sachin Scale currently has an  RPE score of 7/10. This is an advancement due to the patient RPE score was an 9/10 on eval     Home exercise program: Continue with BUE HEP addressing AROM. Pt is to perform HEP that consist of AROM exercises such as Straight arm swings, Shoulder Shrugs, Shoulder rotation backwards and forwards, Flexion, extension)  to increase BUE strength,endurance,ROM and flexion to perform adls and iadls. HEP is to be performed 2-3x a day with rest breaks as needed, Completing each exercises 15 times each daily. while seated    Continued need for the following skills: Nursing and Physical Therapy    The following discharge planning was discussed with the pt/caregiver: DC OT. Pt has reached maximal potential with self care and functional mobility in her home setting.   Caregiver/spouse

## 2021-11-26 ENCOUNTER — HOME CARE VISIT (OUTPATIENT)
Dept: SCHEDULING | Facility: HOME HEALTH | Age: 76
End: 2021-11-26
Payer: MEDICARE

## 2021-11-26 PROCEDURE — G0151 HHCP-SERV OF PT,EA 15 MIN: HCPCS

## 2021-11-26 PROCEDURE — 400013 HH SOC

## 2021-11-26 PROCEDURE — 3331090002 HH PPS REVENUE DEBIT

## 2021-11-26 PROCEDURE — 3331090001 HH PPS REVENUE CREDIT

## 2021-11-26 NOTE — Clinical Note
Patient has been seen for home care physical therapy following a referral for COPD, chronic pain with a history of pneumonia. She has made good progress in the home but continues to demonstrate oxygen dependence and reduced endurance. She has chronic pain of the left LE with her pain ranging from 7 to a 9/10. She is able to transfer to and from the bedside commode, toilet, and the couch with mod I using the rollator. She is ambulating on even surfaces in the home with the rollator with mod I with gait characterized by continuous pattern with reduced step length and good foot clearance. She refused practicing egressing and entering the house via the steps at the front door throughout the course of therapy. She has been going out to medical appointments with her caregiver that assists her 5 days a week. Her tinetti balance score improved from a 11/28 to a 15/28 with a greater than a 4 point increase in the tinetti score indicating a statistically significant reduction in fall risk. She has been educated on deep breathing exercises, energy conservation and a HEP. She has reached her max potential in the home (although egressing house was not able to be addressed due to refusal)  and is ready for DC from home care PT at this time.

## 2021-11-27 VITALS
DIASTOLIC BLOOD PRESSURE: 65 MMHG | SYSTOLIC BLOOD PRESSURE: 128 MMHG | HEART RATE: 74 BPM | OXYGEN SATURATION: 96 % | RESPIRATION RATE: 18 BRPM

## 2021-11-27 PROCEDURE — 3331090001 HH PPS REVENUE CREDIT

## 2021-11-27 PROCEDURE — 3331090002 HH PPS REVENUE DEBIT

## 2021-11-28 PROCEDURE — 3331090001 HH PPS REVENUE CREDIT

## 2021-11-28 PROCEDURE — 3331090002 HH PPS REVENUE DEBIT

## 2021-11-28 NOTE — HOME HEALTH
S:  patient reports that her grandson is out of town until Monday and her caregiver is also out of town until Monday so she is \"on her own\"    O: PAIN: L hip down to the left toes (\"like someone cruched my leg and set it on fire\") AT EVALUATION  current 9/10 (highest 9/10) (lowest 6/10) AT EVALUATION  L hip/LE pain 8/10, highest is 9/10, lowest 7/10 AT DISCHARGE     WOUND:no open sores or wounds reported    ROM: B hip flexion, abduction and adduction WFL   B knee flexion/extension WFL     STRENGTH: R knee ext 4+/5, R knee flexion 4+/5, R hip flex 4/5, R hip abd/adduction 4+/5 AT EVALUATION   L knee flexion 3+/5 and extension 3+/5, L hip flexion 3+/5 abduction and adduction 4-/5 AT EVALUATION   R knee ext 4+/5, R knee flexion 4+/5, R hip flex 4/5, R hip abd/adduction 4+/5 AT DISCHARGE   L knee flexion 3+/5 and extension 3+/5, L hip flexion 3+/5 abduction and adduction 4-/5 AT DISCHARGE    BED MOBILITY: patient is still sleeping on the recliner and has not wanted to practice getting in and out of the bed due to pain - she reports that she is content with sleeping in the recliner     EQUIPMENT: rollator, 3 in 1 commode, oxygen,     TRANSFERS: patient is mod I with transfers to and from the couch/recliner, bedside commode, and toilet     GAIT: ambulating with continuous gait pattern, short symmetrical step length and functional foot clearance   she ambulated on even surfaces today with the rollator walker for 35 feet x 2 with mod I  she declined egressing the house and walking outside due to the cold and her pain     STEPS: steps to egress the home not tested today due to patient refusal - she reports that she needs assistance to complete this - she reports she holds one railing and then needs a person to hold her other arm to help her get down and up the steps     BALANCE: tinetti 11/28 high fall risk AT EVALUATION   tinetti 15/28 AT DISCHARGE  greater than a 4 point increase in the tinetti score indicates a statistically significant reduction in fall risk      A:ASSESSMENT AND PROGRESS TOWARD GOALS: Patient has been seen for home care physical therapy following a referral for COPD, chronic pain with a history of pneumonia. She has made good progress in the home but continues to demonstrate oxygen dependence and reduced endurance. She has chronic pain of the left LE with her pain ranging from 7 to a 9/10. She is able to transfer to and from the bedside commode, toilet, and the couch with mod I using the rollator. She is ambulating on even surfaces in the home with the rollator with mod I with gait characterized by continuous pattern with reduced step length and good foot clearance. She refused practicing egressing and entering the house via the steps at the front door throughout the course of therapy. She has been going out to medical appointments with her caregiver that assists her 5 days a week. Her tinetti balance score improved from a 11/28 to a 15/28 with a greater than a 4 point increase in the tinetti score indicating a statistically significant reduction in fall risk. She has been educated on deep breathing exercises, energy conservation and a HEP. She has reached her max potential in the home (although egressing house was not able to be addressed due to refusal)  and is ready for DC from home care PT at this time. P: DC PT    PMH/Summary of clinical condition: hospitalized for the following per epic \"Discharge diagnosis:    Acute metabolic toxic encephalopathy, possible sepsis, medication related, improved and resolved    Aspiration pneumonia    Enterobacter urinary tract infection, present on admission    Recent LLL pulmonary embolism on Eliquis. Recent COVID-19 pneumonia    Severe hyperkalemia, emergently treated, resolved. Acute kidney injury, resolved    Physical deconditioning    Chronic pain on morphine    s/p fall. \"    PAST MEDICAL HISTORY PER EPIC \" A-fib (HCC)      Chronic pain syndrome  COPD [facility]ch[facility] obstructive [facility] disease) [facility])      Depression      DM2 (diabetes mellitus, type 2) ([facility])      HLD (hyperlipidemia)      HTN (hypertension)      Hydronephrosis      Hypothyroidism      Insomnia      Parkinson disease (HCC)      PCOS (polycystic ovarian syndrome)      Perforated gastric ulcer ([facility])      Restless leg syndrome\"    medications reconciled in the home   medications are effective at this time    social history/ caregivers:patient lives with her grandson in a one story house with steps to enter and exit via the front door.  she also has a paid caregiver that helps her when needed with meals, ADLs, mobility, medications and transportation     Patient education provided this visit: safety, HEP, fall risk - patient expressed understanding     Home exercise program:5 reps slow deep breathes, seated LE and UE exercises: AP, LAQ, low backward arm circles, marches, backward chicken arms, hip abduction, bicep curls, isometric hip abduction, UE pray and opens 15 reps 3 X PER DAY

## 2021-11-29 PROCEDURE — 3331090001 HH PPS REVENUE CREDIT

## 2021-11-29 PROCEDURE — 3331090002 HH PPS REVENUE DEBIT

## 2021-11-30 ENCOUNTER — HOME CARE VISIT (OUTPATIENT)
Dept: SCHEDULING | Facility: HOME HEALTH | Age: 76
End: 2021-11-30
Payer: MEDICARE

## 2021-11-30 PROCEDURE — 3331090002 HH PPS REVENUE DEBIT

## 2021-11-30 PROCEDURE — 3331090001 HH PPS REVENUE CREDIT

## 2021-12-01 ENCOUNTER — HOME CARE VISIT (OUTPATIENT)
Dept: SCHEDULING | Facility: HOME HEALTH | Age: 76
End: 2021-12-01
Payer: MEDICARE

## 2021-12-01 VITALS
TEMPERATURE: 96.9 F | RESPIRATION RATE: 17 BRPM | OXYGEN SATURATION: 99 % | SYSTOLIC BLOOD PRESSURE: 155 MMHG | HEART RATE: 91 BPM | DIASTOLIC BLOOD PRESSURE: 91 MMHG

## 2021-12-01 PROCEDURE — 3331090002 HH PPS REVENUE DEBIT

## 2021-12-01 PROCEDURE — 3331090001 HH PPS REVENUE CREDIT

## 2021-12-01 PROCEDURE — G0300 HHS/HOSPICE OF LPN EA 15 MIN: HCPCS

## 2021-12-02 ENCOUNTER — HOME CARE VISIT (OUTPATIENT)
Dept: SCHEDULING | Facility: HOME HEALTH | Age: 76
End: 2021-12-02
Payer: MEDICARE

## 2021-12-02 PROCEDURE — 3331090002 HH PPS REVENUE DEBIT

## 2021-12-02 PROCEDURE — 3331090001 HH PPS REVENUE CREDIT

## 2021-12-02 NOTE — HOME HEALTH
Skilled reason for visit: COPD, Atrial Fibrillation, Diabetes, Depression, Skin Impairment Prevention and Safety Precautions Education    Caregiver involvement: Patient is independent at this time. Family/friends are available should a need arise. Medications reviewed and all medications ARE available in the home this visit. The following education was provided regarding medications, medication interactions, and look alike medications (specify):-Synthroid-Patient/Caregiver was educated on this medication and the purpose of it. Patient/Caregiver verbalized understanding. Medications are EFFECTIVE at this time. Patient has not taken this medication today as of yet. Home health supplies by type and quantity ordered/delivered this visit include: N/A    Patient education provided this visit: Education was reinforced on the importance of monitoring her blood sugar, blood pressure, and recording values every day, as well as taking her medication as ordered and maintaining safety precautions. Progress toward goals: Patient has reached baseline potential as she states she does not check her blood sugar, she only has it checked at the doctor and she knows how to manage her other disease processes and her medications. Home exercise program: Patient will continue to take medications as ordered by MD and maintain her health as best she can in order to prevent readmission to any acute care facility. Continued need for the following skills: Nursing will schedule for an [facility]ASIS discharge as patient is in agreeance with nursing discharge due to education being complete. Patient and/or caregiver notified and agrees to changes in the Plan of Care Yes     The following discharge planning was discussed with the pt/caregiver:  when patient reaches her goals and medication is managed, and disease processes are understood patient agrees and understand that discharge will take place.

## 2021-12-03 PROCEDURE — 3331090002 HH PPS REVENUE DEBIT

## 2021-12-03 PROCEDURE — 3331090001 HH PPS REVENUE CREDIT

## 2021-12-04 PROCEDURE — 3331090002 HH PPS REVENUE DEBIT

## 2021-12-04 PROCEDURE — 3331090001 HH PPS REVENUE CREDIT

## 2021-12-05 PROCEDURE — 3331090002 HH PPS REVENUE DEBIT

## 2021-12-05 PROCEDURE — 3331090001 HH PPS REVENUE CREDIT

## 2021-12-06 PROCEDURE — 3331090001 HH PPS REVENUE CREDIT

## 2021-12-06 PROCEDURE — 3331090002 HH PPS REVENUE DEBIT

## 2021-12-07 ENCOUNTER — HOME CARE VISIT (OUTPATIENT)
Dept: SCHEDULING | Facility: HOME HEALTH | Age: 76
End: 2021-12-07
Payer: MEDICARE

## 2021-12-07 PROCEDURE — 3331090002 HH PPS REVENUE DEBIT

## 2021-12-07 PROCEDURE — 3331090001 HH PPS REVENUE CREDIT

## 2021-12-08 PROCEDURE — 3331090002 HH PPS REVENUE DEBIT

## 2021-12-08 PROCEDURE — 3331090001 HH PPS REVENUE CREDIT

## 2021-12-09 PROCEDURE — 3331090001 HH PPS REVENUE CREDIT

## 2021-12-09 PROCEDURE — 3331090002 HH PPS REVENUE DEBIT

## 2021-12-10 PROCEDURE — 3331090001 HH PPS REVENUE CREDIT

## 2021-12-10 PROCEDURE — 3331090002 HH PPS REVENUE DEBIT

## 2021-12-11 ENCOUNTER — HOME CARE VISIT (OUTPATIENT)
Dept: SCHEDULING | Facility: HOME HEALTH | Age: 76
End: 2021-12-11
Payer: MEDICARE

## 2021-12-11 PROCEDURE — 3331090002 HH PPS REVENUE DEBIT

## 2021-12-11 PROCEDURE — 3331090001 HH PPS REVENUE CREDIT

## 2021-12-11 NOTE — Clinical Note
The visit for today 12/11/21 was missed due to the following reason(s): patient called and stated she was very tired and just wants to rest today, does not want a visit. SN explained if patient is not feeling well that SN should see her to make sure everything is okay, patient still declining visit stating she will be ready for her visit next week.  notified, today's visit will be missed visit. Today's visit was for OASIS DC, SN notified  to make visit for discharge next week, orders go through week of 12/18.

## 2021-12-12 PROCEDURE — 3331090001 HH PPS REVENUE CREDIT

## 2021-12-12 PROCEDURE — 3331090002 HH PPS REVENUE DEBIT

## 2021-12-13 PROCEDURE — 3331090001 HH PPS REVENUE CREDIT

## 2021-12-13 PROCEDURE — 3331090002 HH PPS REVENUE DEBIT

## 2021-12-14 PROCEDURE — 3331090001 HH PPS REVENUE CREDIT

## 2021-12-14 PROCEDURE — 3331090002 HH PPS REVENUE DEBIT

## 2021-12-15 PROCEDURE — 3331090002 HH PPS REVENUE DEBIT

## 2021-12-15 PROCEDURE — 3331090001 HH PPS REVENUE CREDIT

## 2021-12-16 ENCOUNTER — HOME CARE VISIT (OUTPATIENT)
Dept: HOME HEALTH SERVICES | Facility: HOME HEALTH | Age: 76
End: 2021-12-16
Payer: MEDICARE

## 2021-12-16 ENCOUNTER — HOME CARE VISIT (OUTPATIENT)
Dept: SCHEDULING | Facility: HOME HEALTH | Age: 76
End: 2021-12-16
Payer: MEDICARE

## 2021-12-16 VITALS
TEMPERATURE: 96 F | SYSTOLIC BLOOD PRESSURE: 124 MMHG | OXYGEN SATURATION: 96 % | RESPIRATION RATE: 19 BRPM | HEART RATE: 63 BPM | DIASTOLIC BLOOD PRESSURE: 68 MMHG

## 2021-12-16 PROCEDURE — G0299 HHS/HOSPICE OF RN EA 15 MIN: HCPCS

## 2021-12-16 PROCEDURE — 3331090002 HH PPS REVENUE DEBIT

## 2021-12-16 PROCEDURE — 3331090001 HH PPS REVENUE CREDIT

## 2021-12-16 NOTE — HOME HEALTH
Skilled reason for visit: COPD AND MEDICATION TEACHING. Caregiver involvement: HAS PRIVATE AID AND FAMILY ASSIST WITH TRANSPORTATION,MEAL PREP, ADLS/IADLS    Medications reviewed and all medications are available in the home this visit. The following education was provided regarding medications, medication interactions, and look alike medications (specify): reviewed all medication side effects and no reported side effects. Medications  are effective at this time. NO CHANGES    Home health supplies by type and quantity ordered/delivered this visit include: [facility]     Patient education provided this visit:Reviewed fall precautions and safety:dangle, uses assistive device at all times, non skid foot wear,and night light. Verbalized understanding. COPD s/sx fever,chills, increased heart/respiratory, mucous:green/brown/yellow, increase SOB, increase fatigue, unable to urinate, or increase swelling to MD. advised to call medical provider for non-life-threatening concerns before going to ER/urgent care. REVIEWED TO KEEP SKIN CLEAN, DRY, SKIN PROTECTANT, TURN REPOSITION EVERY 2 HOURS AND IMPORTANCE OF PROTEIN IN DIET TO PREVENT SKIN BREAKDOWN. USE SKIN BARRIER AS PRESCRIBED AND INCREASE PROTEIN IN DIET. REVIEWED SKIN CARE AND THE IMPORTANCE OF KEEPING SKIN CLEAN,DRY,APPLYING SKIN PROTECTANT DAILY EXCEPT BETWEEN TOES. VERBALIZED UNDERSTANDING. Reviewed to reported any redness, swelling, warmth, fever, chills, increased heart/respiratory rate, uncontrolled pain/tenderness, drainage:green/yellow/brown, or odor to MD immediately. Patient level of understanding of education provided: VERBALIZED UNDERSTANDING AND REINFORCE TEACHING WITH PRESSURE ULCER. Skilled Care Performed this visit: REPORTED THAT FELT SORE ON RIGHT BUTTOCKS AND BLOOD IN PANTIES YESTERDAY. HAS A DIME SIZE STAGE PRESSURE ULCER. HAS CALMOSEPTINE IN HOME FROM PREVIOUS PRESSURE ULCER AREA.  DEMONSTRATED TO APPLY CALMOSEPTINE TO AREA/CHANGE POSITION AT LEAST EVERY 2 HOURS. DECLINED AIDE REPORTED THAT PAYS SOMEONE TO ASSIST WITH BATHING/DRESSING DAILY. Patient response to procedure performed:  TOLERATED WOUND ASSESSMENT WITHOUT ANY COMPLAINTS OF PAIN. Agency Progress toward goals: ALL GOALS WILL BE MET    Patient's Progress towards personal goals: ALL GOALS WILL BE MET    Home exercise program: CONT ALL MEDICATIONS AS PRESCRIBED, DIET AS PRESCRIBED, IMPLEMENT FALL/INFECTION CONTROL/PRESSURE ULCER INTERVENTIONS,MONITOR FOR ABNORMAL S/SX INFECTION (LISTED ABOVE) Micheal Cardoso TO MD IMMEDIATELY, AND PERFORM DAILY SKIN CARE APPLY SKIN BARRIER DAILY. KEEP ALL FOLLOW UP APPTS AS PRESCRIBED. READ ALL TEACHING PACKETS FOR EDUCATION OF DISEASE PROCESS & TO PREVENT COMPLICATIONS. Continued need for the following skills: Nursing    Plan for next visit: DISCHARGE    Patient and/or caregiver notified and agrees to changes in the Plan of Care N/A      The following discharge planning was discussed with the pt/caregiver:  Will discharge when all PRESSURE ULCER/COPD disease process, complication and dietary goals are met and understands all medication purpose/frequencies/side effects

## 2021-12-17 PROCEDURE — 3331090002 HH PPS REVENUE DEBIT

## 2021-12-17 PROCEDURE — 3331090001 HH PPS REVENUE CREDIT

## 2021-12-18 PROCEDURE — 3331090001 HH PPS REVENUE CREDIT

## 2021-12-18 PROCEDURE — 3331090002 HH PPS REVENUE DEBIT

## 2021-12-19 PROCEDURE — 3331090001 HH PPS REVENUE CREDIT

## 2021-12-19 PROCEDURE — 3331090002 HH PPS REVENUE DEBIT

## 2021-12-20 PROCEDURE — 3331090002 HH PPS REVENUE DEBIT

## 2021-12-20 PROCEDURE — 3331090001 HH PPS REVENUE CREDIT

## 2021-12-21 PROCEDURE — 3331090001 HH PPS REVENUE CREDIT

## 2021-12-21 PROCEDURE — 3331090002 HH PPS REVENUE DEBIT

## 2021-12-22 ENCOUNTER — HOME CARE VISIT (OUTPATIENT)
Dept: SCHEDULING | Facility: HOME HEALTH | Age: 76
End: 2021-12-22
Payer: MEDICARE

## 2021-12-22 PROCEDURE — G0299 HHS/HOSPICE OF RN EA 15 MIN: HCPCS

## 2021-12-22 PROCEDURE — 3331090002 HH PPS REVENUE DEBIT

## 2021-12-22 PROCEDURE — 3331090001 HH PPS REVENUE CREDIT

## 2021-12-23 PROCEDURE — 3331090002 HH PPS REVENUE DEBIT

## 2021-12-23 PROCEDURE — 3331090001 HH PPS REVENUE CREDIT

## 2021-12-24 PROCEDURE — 3331090001 HH PPS REVENUE CREDIT

## 2021-12-24 PROCEDURE — 3331090002 HH PPS REVENUE DEBIT

## 2021-12-25 VITALS
TEMPERATURE: 98.5 F | OXYGEN SATURATION: 100 % | SYSTOLIC BLOOD PRESSURE: 128 MMHG | DIASTOLIC BLOOD PRESSURE: 82 MMHG | RESPIRATION RATE: 16 BRPM | HEART RATE: 76 BPM

## 2021-12-26 NOTE — HOME HEALTH
Skilled reason for visit: OASIS DISCHARGE    Caregiver involvement: patients cg is grandson and is available as needed or assistance with IADL's, ADL's, meal prep, medication management, and taking patient to all doctors appointments. Medications reconciled and all medications are available in the home this visit. The following education was provided regarding medications, medication interactions, and look alike medications (specify): morphine. Medications  are effective at this time. Home health supplies by type and quantity ordered/delivered this visit include: NA    Patient education provided this visit: Instruct patient/caregiver in methods to conserve energy. TAKE FREQUENT BREAKS, USE ASSISTIVE Tonia Canales made aware to turn every 2 hours and to keep pressure off of bony prominences, to monitor for any pressure ulcer development/worsening. Reviewed fall precautions and safety:dangle, uses assistive device at all times, non skid foot wear,and night light. Verbalized understanding. Reviewed to reported any redness, swelling, warmth, fever, chills, increased heart/respiratory rate, uncontrolled pain/tenderness, drainage:green/yellow/brown, or odor to MD immediately. reviewed cardiac diet- monitoring sodium intake, cholesterol and fat intake. patient aware to limit sodium, no added sodium to diet. reviewed foods to avoid, how to order foods when eating out, how to read nutrition labels and measure sodium, cholesterol and fat intake. pt instructed to follow with diabetic diet- monitoring sugar intake, limiting foods with high sugar content. pt educated on the importance of staying hydrated and drinking water througout the day MEDICATION ADHERENCE IS AN IMPORTANT COMPONENT OF HTN MANAGEMENT. PATIENT STATES THE IMPORTANCE TO TAKE HTN MEDS SAME TIME EACH DAY. patient aware to monitor for effectiveness and to notify staff of any adverse reactions to medications/any changes to medication regimen. reviewed side effects, purposes, dosage, frequencies                                Patient level of understanding of education provided: Herbreth Amin 647 Performed this visit: OASIS DISCHARGE    Patient response to procedure performed:  pt understood well    Agency Progress toward goals: ALL GOALS MET  Patient's Progress towards personal goals: ALL GOALS MET    Home exercise program: activity as tolerated, trying to get physical activity 4-5 x weekly. stopping activity if causing shortness of breath or chest pain, dizziness or weakness. Continued need for the following skills: NONE        Patient and/or caregiver notified and agrees to changes in the Plan of Care YES      The following discharge planning was discussed with the pt/caregiver: All goals have been met, pt is medically stable, education is complete and all needs have been met. Patient will be discharged today.

## 2022-08-30 NOTE — HOME HEALTH
Caregiver involvement: Pt lives with grandson who is in and out of the house with work. She also has paid CG that is present as needed. Medications reconciled and all medications are available in the home this visit. Medications are effective at this time. Lorazepam 0.5mg tablet   take half twice daily as needed for pain related anxiety. Patient education provided this visit: breathing with activity. pressure relief changing positions and amb every hour. Progress toward goals: Pt reports no falls. Discussed maintaining clear and lighted pathways, removing throw rugs and wearing non skid footwear. Pt states she is sleeping in the recliner because she feels she breathes better propped up. She transitions to and from standing with B UE support, but requires occ vc for correct hand placement. She did remember to lock the brakes on the 4 wheeled walker before transitions. Pt is amb in home over carpeted and non carpeted surfaces with very slow dell and decreased step length. VC for breathing t/o. O2 sats 91% and HR 82 bpm upon sitting w/o O2. Plan to continue working on standing balance to decrease risk of falls getting to and from the bathroom / commode. Continued need for the following skills: Continue HHPT to increase safety and independence with household mobility, decreasing risk of falls. The following discharge planning was discussed with the pt/caregiver: d/c HHPT in 6 more visits. Show Applicator Variable?: Yes Medical Necessity Information: It is in your best interest to select a reason for this procedure from the list below. All of these items fulfill various CMS LCD requirements except the new and changing color options. Spray Paint Technique: No Spray Paint Text: The liquid nitrogen was applied to the skin utilizing a spray paint frosting technique. Medical Necessity Clause: This procedure was medically necessary because the lesions that were treated were: Post-Care Instructions: I reviewed with the patient in detail post-care instructions. Patient is to wear sunprotection, and avoid picking at any of the treated lesions. Pt may apply Vaseline to crusted or scabbing areas. Consent: The patient's consent was obtained including but not limited to risks of crusting, scabbing, blistering, scarring, darker or lighter pigmentary change, recurrence, incomplete removal and infection. Detail Level: Detailed

## 2022-11-02 PROBLEM — E11.65 HYPERGLYCEMIA DUE TO TYPE 2 DIABETES MELLITUS (HCC): Status: ACTIVE | Noted: 2022-11-02
